# Patient Record
Sex: MALE | Race: WHITE | NOT HISPANIC OR LATINO | Employment: FULL TIME | RURAL
[De-identification: names, ages, dates, MRNs, and addresses within clinical notes are randomized per-mention and may not be internally consistent; named-entity substitution may affect disease eponyms.]

---

## 2022-07-31 ENCOUNTER — HOSPITAL ENCOUNTER (EMERGENCY)
Facility: HOSPITAL | Age: 47
Discharge: HOME | End: 2022-07-31

## 2022-07-31 VITALS
RESPIRATION RATE: 18 BRPM | HEART RATE: 69 BPM | OXYGEN SATURATION: 99 % | BODY MASS INDEX: 34.39 KG/M2 | WEIGHT: 214 LBS | TEMPERATURE: 98 F | DIASTOLIC BLOOD PRESSURE: 71 MMHG | SYSTOLIC BLOOD PRESSURE: 120 MMHG | HEIGHT: 66 IN

## 2022-07-31 DIAGNOSIS — B07.9 BENIGN SKIN LESION EXCLUDING PLANTAR WART: Primary | ICD-10-CM

## 2022-07-31 DIAGNOSIS — L57.0 BENIGN SKIN LESION EXCLUDING PLANTAR WART: Primary | ICD-10-CM

## 2022-07-31 DIAGNOSIS — L98.9 BENIGN SKIN LESION EXCLUDING PLANTAR WART: Primary | ICD-10-CM

## 2022-07-31 PROCEDURE — 99281 EMR DPT VST MAYX REQ PHY/QHP: CPT

## 2022-07-31 PROCEDURE — 99282 EMERGENCY DEPT VISIT SF MDM: CPT | Mod: ,,, | Performed by: NURSE PRACTITIONER

## 2022-07-31 PROCEDURE — 99282 PR EMERGENCY DEPT VISIT,LEVEL II: ICD-10-PCS | Mod: ,,, | Performed by: NURSE PRACTITIONER

## 2022-07-31 NOTE — ED TRIAGE NOTES
Presents to ed with daughter c/o wart pain to bottom of left palm. Reported he was moving a couch and it pulled top of wart causing bleeding .no bleeding at time.

## 2022-07-31 NOTE — ED PROVIDER NOTES
Encounter Date: 7/31/2022       History   No chief complaint on file.    46 yr old WM to ED with c/o bumped wart on palm of left hand this morning and pulled top off of wart.    States wart has been on his hand for years and has tried OTC removals without relief.  .      The history is provided by the patient.   Hand Injury   The incident occurred today. The pain is present in the left hand. He reports no foreign bodies present.     Review of patient's allergies indicates:  No Known Allergies  History reviewed. No pertinent past medical history.  History reviewed. No pertinent surgical history.  History reviewed. No pertinent family history.  Social History     Tobacco Use    Smoking status: Current Some Day Smoker     Types: Cigarettes    Smokeless tobacco: Never Used   Substance Use Topics    Drug use: Never     Review of Systems   Constitutional: Negative.    Respiratory: Negative.    Cardiovascular: Negative.    Musculoskeletal: Negative.    Skin:        Wart with top pulled off while moving a couch this morning.        Physical Exam     Initial Vitals [07/31/22 1213]   BP Pulse Resp Temp SpO2   120/78 74 16 98.3 °F (36.8 °C) 99 %      MAP       --         Physical Exam    Nursing note and vitals reviewed.  Constitutional: He appears well-developed and well-nourished.   Cardiovascular: Normal rate and regular rhythm.   Pulmonary/Chest: Breath sounds normal.   Musculoskeletal:         General: Normal range of motion.     Neurological: He is alert and oriented to person, place, and time.   Skin: Skin is warm and dry. Capillary refill takes less than 2 seconds.         Medical Screening Exam   See Full Note    ED Course   Procedures  Labs Reviewed - No data to display       Imaging Results    None          Medications - No data to display                    Clinical Impression:   Final diagnoses:  [L98.9, B07.9, L57.0] Benign skin lesion excluding plantar wart (Primary)          ED Disposition Condition     Discharge Stable        ED Prescriptions     None        Follow-up Information     Follow up With Specialties Details Why Contact Info    [facility]   [address] [id number] [phone]             TRAY Bradley  07/31/22 1238

## 2022-07-31 NOTE — Clinical Note
"Akira "Akira" Pancho was seen and treated in our emergency department on 7/31/2022.  He may return to work on 08/01/2022.       If you have any questions or concerns, please don't hesitate to call.      TRAY Bradley"

## 2022-07-31 NOTE — DISCHARGE INSTRUCTIONS
Keep clean and dry with antibacterial soap and water.  Follow up with your primary care provider or [facility] .  Return for emergency needs

## 2023-06-21 ENCOUNTER — OFFICE VISIT (OUTPATIENT)
Dept: FAMILY MEDICINE | Facility: CLINIC | Age: 48
End: 2023-06-21

## 2023-06-21 VITALS
HEIGHT: 66 IN | WEIGHT: 200 LBS | DIASTOLIC BLOOD PRESSURE: 80 MMHG | TEMPERATURE: 99 F | OXYGEN SATURATION: 99 % | HEART RATE: 72 BPM | BODY MASS INDEX: 32.14 KG/M2 | SYSTOLIC BLOOD PRESSURE: 119 MMHG

## 2023-06-21 DIAGNOSIS — H66.90 EAR INFECTION: ICD-10-CM

## 2023-06-21 DIAGNOSIS — R42 VERTIGO: ICD-10-CM

## 2023-06-21 DIAGNOSIS — H92.03 EAR PAIN, BILATERAL: ICD-10-CM

## 2023-06-21 DIAGNOSIS — J06.9 UPPER RESPIRATORY TRACT INFECTION, UNSPECIFIED TYPE: Primary | ICD-10-CM

## 2023-06-21 LAB
CTP QC/QA: YES
FLUAV AG NPH QL: NEGATIVE
FLUBV AG NPH QL: NEGATIVE
SARS-COV-2 AG RESP QL IA.RAPID: NEGATIVE

## 2023-06-21 PROCEDURE — 87428 POCT SARS-COV2 (COVID) WITH FLU ANTIGEN: ICD-10-PCS | Mod: QW,,, | Performed by: STUDENT IN AN ORGANIZED HEALTH CARE EDUCATION/TRAINING PROGRAM

## 2023-06-21 PROCEDURE — 99203 OFFICE O/P NEW LOW 30 MIN: CPT | Mod: 25,,, | Performed by: STUDENT IN AN ORGANIZED HEALTH CARE EDUCATION/TRAINING PROGRAM

## 2023-06-21 PROCEDURE — 87428 SARSCOV & INF VIR A&B AG IA: CPT | Mod: QW,,, | Performed by: STUDENT IN AN ORGANIZED HEALTH CARE EDUCATION/TRAINING PROGRAM

## 2023-06-21 PROCEDURE — 99203 PR OFFICE/OUTPT VISIT, NEW, LEVL III, 30-44 MIN: ICD-10-PCS | Mod: 25,,, | Performed by: STUDENT IN AN ORGANIZED HEALTH CARE EDUCATION/TRAINING PROGRAM

## 2023-06-21 PROCEDURE — 96372 THER/PROPH/DIAG INJ SC/IM: CPT | Mod: ,,, | Performed by: STUDENT IN AN ORGANIZED HEALTH CARE EDUCATION/TRAINING PROGRAM

## 2023-06-21 PROCEDURE — 96372 PR INJECTION,THERAP/PROPH/DIAG2ST, IM OR SUBCUT: ICD-10-PCS | Mod: ,,, | Performed by: STUDENT IN AN ORGANIZED HEALTH CARE EDUCATION/TRAINING PROGRAM

## 2023-06-21 RX ORDER — HYDROCORTISONE AND ACETIC ACID 20.75; 10.375 MG/ML; MG/ML
3 SOLUTION AURICULAR (OTIC) 2 TIMES DAILY
Qty: 20 ML | Refills: 0 | Status: SHIPPED | OUTPATIENT
Start: 2023-06-21 | End: 2023-07-01

## 2023-06-21 RX ORDER — AMOXICILLIN AND CLAVULANATE POTASSIUM 875; 125 MG/1; MG/1
1 TABLET, FILM COATED ORAL EVERY 12 HOURS
Qty: 10 TABLET | Refills: 0 | Status: SHIPPED | OUTPATIENT
Start: 2023-06-21 | End: 2023-06-26

## 2023-06-21 RX ORDER — DEXAMETHASONE SODIUM PHOSPHATE 4 MG/ML
4 INJECTION, SOLUTION INTRA-ARTICULAR; INTRALESIONAL; INTRAMUSCULAR; INTRAVENOUS; SOFT TISSUE
Status: COMPLETED | OUTPATIENT
Start: 2023-06-21 | End: 2023-06-21

## 2023-06-21 RX ORDER — FLUTICASONE PROPIONATE 50 MCG
2 SPRAY, SUSPENSION (ML) NASAL 2 TIMES DAILY
Qty: 15.5 ML | Refills: 2 | Status: SHIPPED | OUTPATIENT
Start: 2023-06-21 | End: 2024-03-07

## 2023-06-21 RX ORDER — BUSPIRONE HYDROCHLORIDE 15 MG/1
15 TABLET ORAL
COMMUNITY
End: 2024-03-07

## 2023-06-21 RX ADMIN — DEXAMETHASONE SODIUM PHOSPHATE 4 MG: 4 INJECTION, SOLUTION INTRA-ARTICULAR; INTRALESIONAL; INTRAMUSCULAR; INTRAVENOUS; SOFT TISSUE at 10:06

## 2023-06-21 NOTE — PATIENT INSTRUCTIONS
You should be feeling better within 2 days - give us a call back and let us know if you are not feeling better.

## 2023-06-21 NOTE — LETTER
June 21, 2023      [facility]  [address]  [place] [address]  Phone: [phone]  Fax: [phone]       Patient: Akira Deleon   YOB: 1975  Date of Visit: 06/21/2023    To Whom It May Concern:    Shannon Deleon  was at [facility] on 06/21/2023. The patient may return to work/school on :  06/22/2023 with no restrictions. If you have any questions or concerns, or if I can be of further assistance, please do not hesitate to contact me.    Sincerely,    Mildred Oswald LPN

## 2023-06-21 NOTE — PROGRESS NOTES
"Subjective     Patient ID: Akira Deleon is a 47 y.o. male.    Chief Complaint: Otalgia (Both ears), Dizziness, Headache, Generalized Body Aches, and Chills (X 3 days)    HPI    47-year-old new patient here with with URI symptoms the last several days. His biggest concern is b/l rar pain; extends down both side of his neck No vision changes - but does gets dizzy and does have vertigo. Also complains of sore throat. Itchy. Some nasal congestion. Elevated temperature recorded home. Moderate symptoms. No exacerbating / relieving factors noted.      Objective     /80   Pulse 72   Temp 98.9 °F (37.2 °C) (Oral)   Ht 5' 6" (1.676 m)   Wt 90.7 kg (200 lb)   SpO2 99%   BMI 32.28 kg/m²       Physical Exam    Gen: well-groomed, well-dressed. No apparent distress  HEENT:  NCAT, symmetric; b/l TM benign; external auditory canal ttp; no mastoid tenderness but surrounding musculature slightly ttp; no palpable cervical LAD or unilateral symptoms; no paransal/frontal sinus tenderness. Oropharynx benign  Pulm: normal work of breathing, no accessory muscle use; speaking complete sentences without having to stop  Neuro: CN II-XII grossly normal   Psych: pleasant affect, congruent mood. Linear thought; good eye contact  SKIN: no rashes present on face, neck, hands,e       Assessment and Plan     Problem List Items Addressed This Visit    None  Visit Diagnoses       Upper respiratory tract infection, unspecified type    -  Primary    Relevant Orders    POCT SARS-COV2 (COVID) with Flu Antigen (Completed)    Ear infection        Relevant Medications    amoxicillin-clavulanate 875-125mg (AUGMENTIN) 875-125 mg per tablet    fluticasone propionate (FLONASE) 50 mcg/actuation nasal spray    sodium chloride (SALINE NASAL) 0.65 % nasal spray    acetic acid-hydrocortisone (VOSOL-HC) otic solution    dexAMETHasone injection 4 mg (Completed)    Ear pain, bilateral        Relevant Medications    amoxicillin-clavulanate 875-125mg " (AUGMENTIN) 875-125 mg per tablet    fluticasone propionate (FLONASE) 50 mcg/actuation nasal spray    sodium chloride (SALINE NASAL) 0.65 % nasal spray    acetic acid-hydrocortisone (VOSOL-HC) otic solution    dexAMETHasone injection 4 mg (Completed)    Vertigo        Relevant Medications    amoxicillin-clavulanate 875-125mg (AUGMENTIN) 875-125 mg per tablet    fluticasone propionate (FLONASE) 50 mcg/actuation nasal spray    sodium chloride (SALINE NASAL) 0.65 % nasal spray    acetic acid-hydrocortisone (VOSOL-HC) otic solution    dexAMETHasone injection 4 mg (Completed)          covid/flu negative in clinic today. Because he is feeling so bad will empirically treat for bacterial infection and IM GC. Also recommended symptomatic treatment as per above and returning to clinic if symptoms worsen or fail to improve in the next few days. He expressed understanding and agreement with this plan.

## 2024-03-07 ENCOUNTER — HOSPITAL ENCOUNTER (EMERGENCY)
Facility: HOSPITAL | Age: 49
Discharge: HOME | End: 2024-03-07
Payer: COMMERCIAL

## 2024-03-07 VITALS
DIASTOLIC BLOOD PRESSURE: 76 MMHG | SYSTOLIC BLOOD PRESSURE: 118 MMHG | OXYGEN SATURATION: 99 % | TEMPERATURE: 98 F | RESPIRATION RATE: 18 BRPM | HEART RATE: 65 BPM

## 2024-03-07 DIAGNOSIS — R42 DIZZINESS: ICD-10-CM

## 2024-03-07 DIAGNOSIS — R42 VERTIGO: Primary | ICD-10-CM

## 2024-03-07 LAB
ALBUMIN SERPL BCP-MCNC: 3.7 G/DL (ref 3.5–5)
ALBUMIN/GLOB SERPL: 1.2 {RATIO}
ALP SERPL-CCNC: 78 U/L (ref 45–115)
ALT SERPL W P-5'-P-CCNC: 25 U/L (ref 16–61)
ANION GAP SERPL CALCULATED.3IONS-SCNC: 13 MMOL/L (ref 7–16)
AST SERPL W P-5'-P-CCNC: 15 U/L (ref 15–37)
BASOPHILS # BLD AUTO: 0.03 K/UL (ref 0–0.2)
BASOPHILS NFR BLD AUTO: 0.3 % (ref 0–1)
BILIRUB SERPL-MCNC: 0.5 MG/DL (ref ?–1.2)
BUN SERPL-MCNC: 16 MG/DL (ref 7–18)
BUN/CREAT SERPL: 13 (ref 6–20)
CALCIUM SERPL-MCNC: 8.1 MG/DL (ref 8.5–10.1)
CHLORIDE SERPL-SCNC: 103 MMOL/L (ref 98–107)
CO2 SERPL-SCNC: 28 MMOL/L (ref 21–32)
CREAT SERPL-MCNC: 1.26 MG/DL (ref 0.7–1.3)
DIFFERENTIAL METHOD BLD: ABNORMAL
EGFR (NO RACE VARIABLE) (RUSH/TITUS): 71 ML/MIN/1.73M2
EOSINOPHIL # BLD AUTO: 0.2 K/UL (ref 0–0.5)
EOSINOPHIL NFR BLD AUTO: 2.3 % (ref 1–4)
ERYTHROCYTE [DISTWIDTH] IN BLOOD BY AUTOMATED COUNT: 12.7 % (ref 11.5–14.5)
GLOBULIN SER-MCNC: 3.1 G/DL (ref 2–4)
GLUCOSE SERPL-MCNC: 137 MG/DL (ref 74–106)
HCT VFR BLD AUTO: 43.8 % (ref 40–54)
HGB BLD-MCNC: 14.8 G/DL (ref 13.5–18)
INFLUENZA A MOLECULAR (OHS): NEGATIVE
INFLUENZA B MOLECULAR (OHS): NEGATIVE
LYMPHOCYTES # BLD AUTO: 2.73 K/UL (ref 1–4.8)
LYMPHOCYTES NFR BLD AUTO: 31.5 % (ref 27–41)
MAGNESIUM SERPL-MCNC: 1.9 MG/DL (ref 1.7–2.3)
MCH RBC QN AUTO: 28.5 PG (ref 27–31)
MCHC RBC AUTO-ENTMCNC: 33.8 G/DL (ref 32–36)
MCV RBC AUTO: 84.2 FL (ref 80–96)
MONOCYTES # BLD AUTO: 0.67 K/UL (ref 0–0.8)
MONOCYTES NFR BLD AUTO: 7.7 % (ref 2–6)
MPC BLD CALC-MCNC: 10.2 FL (ref 9.4–12.4)
NEUTROPHILS # BLD AUTO: 5.04 K/UL (ref 1.8–7.7)
NEUTROPHILS NFR BLD AUTO: 58.2 % (ref 53–65)
PLATELET # BLD AUTO: 191 K/UL (ref 150–400)
POTASSIUM SERPL-SCNC: 3.5 MMOL/L (ref 3.5–5.1)
PROT SERPL-MCNC: 6.8 G/DL (ref 6.4–8.2)
RBC # BLD AUTO: 5.2 M/UL (ref 4.6–6.2)
SARS-COV+SARS-COV-2 AG RESP QL IA.RAPID: NEGATIVE
SODIUM SERPL-SCNC: 140 MMOL/L (ref 136–145)
TROPONIN I SERPL DL<=0.01 NG/ML-MCNC: 5.2 PG/ML
TSH SERPL DL<=0.005 MIU/L-ACNC: 2.31 UIU/ML (ref 0.36–3.74)
WBC # BLD AUTO: 8.67 K/UL (ref 4.5–11)

## 2024-03-07 PROCEDURE — 83735 ASSAY OF MAGNESIUM: CPT | Performed by: NURSE PRACTITIONER

## 2024-03-07 PROCEDURE — 84443 ASSAY THYROID STIM HORMONE: CPT | Performed by: NURSE PRACTITIONER

## 2024-03-07 PROCEDURE — 93005 ELECTROCARDIOGRAM TRACING: CPT

## 2024-03-07 PROCEDURE — 99284 EMERGENCY DEPT VISIT MOD MDM: CPT | Mod: ,,, | Performed by: NURSE PRACTITIONER

## 2024-03-07 PROCEDURE — 87502 INFLUENZA DNA AMP PROBE: CPT | Performed by: NURSE PRACTITIONER

## 2024-03-07 PROCEDURE — 85025 COMPLETE CBC W/AUTO DIFF WBC: CPT | Performed by: NURSE PRACTITIONER

## 2024-03-07 PROCEDURE — 84484 ASSAY OF TROPONIN QUANT: CPT | Performed by: NURSE PRACTITIONER

## 2024-03-07 PROCEDURE — 99285 EMERGENCY DEPT VISIT HI MDM: CPT | Mod: 25

## 2024-03-07 PROCEDURE — 80053 COMPREHEN METABOLIC PANEL: CPT | Performed by: NURSE PRACTITIONER

## 2024-03-07 PROCEDURE — 87426 SARSCOV CORONAVIRUS AG IA: CPT | Performed by: NURSE PRACTITIONER

## 2024-03-07 PROCEDURE — 93010 ELECTROCARDIOGRAM REPORT: CPT | Mod: ,,, | Performed by: INTERNAL MEDICINE

## 2024-03-07 RX ORDER — MECLIZINE HYDROCHLORIDE 25 MG/1
25 TABLET ORAL 3 TIMES DAILY PRN
Qty: 20 TABLET | Refills: 0 | Status: SHIPPED | OUTPATIENT
Start: 2024-03-07

## 2024-03-08 LAB
OHS QRS DURATION: 68 MS
OHS QTC CALCULATION: 430 MS

## 2024-03-08 NOTE — ED NOTES
Patient discharged to home via private vehicle with wife driving, discharge instructions given with voiced understanding. GABE

## 2024-03-08 NOTE — ED PROVIDER NOTES
"Encounter Date: 3/7/2024       History     Chief Complaint   Patient presents with    Dizziness     Started after lunch and continued to worsen     Headache     49 y/o WM presents pov with c/o dizziness with visual changes with onset 10 hours PTA to ED. Patient states dizziness started after lunch today while at work. He spent most of the afternoon looking up working with a back hoe. He thought this might be contributing to his dizziness but the dizziness worsened after he got home. He states his vision is clear but objects he is looking at seem to be pulsating. He states a h/o inner ear with vertigo but states this doesn't present quiet the same way as previous episodes of vertigo. Negative for fever/chills, sinus pressure/pain, nasal congestion, ear pain, nausea, or vomiting. Patient does complain of mild frontal headache. Rates pain 1/10 and describes as dull.        Review of patient's allergies indicates:   Allergen Reactions    Bactrim [sulfamethoxazole-trimethoprim] Anaphylaxis     History reviewed. No pertinent past medical history.  History reviewed. No pertinent surgical history.  Family History   Problem Relation Age of Onset    Hypertension Mother     Diabetes Mother     Heart disease Father     Cancer Father      Social History     Tobacco Use    Smoking status: Unknown    Smokeless tobacco: Current     Types: Snuff    Tobacco comments:     States has been "dipping" 20 years   Substance Use Topics    Alcohol use: Not Currently    Drug use: Never     Review of Systems   Constitutional:  Negative for chills and fever.   HENT:  Negative for congestion, ear discharge, ear pain, facial swelling, hearing loss, postnasal drip, rhinorrhea, sinus pressure, sinus pain, sore throat and trouble swallowing.    Eyes:  Positive for visual disturbance. Negative for photophobia, pain, discharge, redness and itching.   Respiratory:  Negative for apnea, cough, choking, chest tightness, shortness of breath, wheezing and " stridor.    Cardiovascular:  Negative for chest pain, palpitations and leg swelling.   Gastrointestinal: Negative.    Genitourinary: Negative.    Musculoskeletal: Negative.    Skin: Negative.    Neurological:  Positive for dizziness and headaches. Negative for tremors, seizures, syncope, facial asymmetry, speech difficulty, weakness, light-headedness and numbness.   All other systems reviewed and are negative.      Physical Exam     Initial Vitals [03/07/24 2152]   BP Pulse Resp Temp SpO2   119/81 65 18 97.7 °F (36.5 °C) 99 %      MAP       --         Physical Exam    Nursing note and vitals reviewed.  Constitutional: He appears well-developed and well-nourished. No distress.   HENT:   Head: Normocephalic.   Right Ear: Tympanic membrane and ear canal normal.   Left Ear: Tympanic membrane and ear canal normal.   Nose: Nose normal. Right sinus exhibits no maxillary sinus tenderness and no frontal sinus tenderness. Left sinus exhibits no maxillary sinus tenderness and no frontal sinus tenderness.   Mouth/Throat: Uvula is midline, oropharynx is clear and moist and mucous membranes are normal.   Eyes: Conjunctivae and EOM are normal. Pupils are equal, round, and reactive to light. No scleral icterus.   Neck: Neck supple.   Normal range of motion.  Cardiovascular:  Normal rate, regular rhythm, normal heart sounds and intact distal pulses.     Exam reveals no gallop and no friction rub.       No murmur heard.  Pulmonary/Chest: Breath sounds normal. No respiratory distress. He has no wheezes. He has no rhonchi. He has no rales. He exhibits no tenderness.   Abdominal: Abdomen is soft. Bowel sounds are normal. He exhibits no distension and no mass. There is no abdominal tenderness. There is no rebound and no guarding.   Musculoskeletal:         General: No tenderness. Normal range of motion.      Cervical back: Normal range of motion and neck supple.     Lymphadenopathy:     He has no cervical adenopathy.   Neurological: He  is alert and oriented to person, place, and time. He has normal strength. A sensory deficit is present. No cranial nerve deficit. He displays a negative Romberg sign. GCS score is 15. GCS eye subscore is 4. GCS verbal subscore is 5. GCS motor subscore is 6.   Mild numbness left side of face   Skin: Skin is warm and dry. Capillary refill takes less than 2 seconds.   Psychiatric: He has a normal mood and affect. His behavior is normal. Judgment and thought content normal.         Medical Screening Exam   See Full Note    ED Course   Procedures  Labs Reviewed   COMPREHENSIVE METABOLIC PANEL - Abnormal; Notable for the following components:       Result Value    Glucose 137 (*)     Calcium 8.1 (*)     All other components within normal limits   CBC WITH DIFFERENTIAL - Abnormal; Notable for the following components:    Monocytes % 7.7 (*)     All other components within normal limits   INFLUENZA A & B BY MOLECULAR - Normal   TROPONIN I - Normal   TSH - Normal   MAGNESIUM - Normal   SARS ANTIGEN(PEDRO) - Normal    Narrative:     Negative SARS-CoV results should not be used as the sole basis for treatment or patient management decisions; negative results should be considered in the context of a patient's recent exposures, history and the presene of clinical signs and symptoms consistent with COVID-19.  Negative results should be treated as presumptive and confirmed by molecular assay, if necessary for patient management.   CBC W/ AUTO DIFFERENTIAL    Narrative:     The following orders were created for panel order CBC auto differential.  Procedure                               Abnormality         Status                     ---------                               -----------         ------                     CBC with Differential[[id number]]        Abnormal            Final result                 Please view results for these tests on the individual orders.     EKG Readings: (Independently Interpreted)   Rhythm: Normal Sinus  Rhythm. Heart Rate: 71. Ectopy: No Ectopy. Conduction: Normal. ST Segments: Normal ST Segments. T Waves: Normal. Axis: Normal. Clinical Impression: Normal Sinus Rhythm       Imaging Results              CT Head Without Contrast (In process)                      X-Ray Chest 1 View (In process)                      Medications - No data to display  Medical Decision Making  49 y/o WM presents pov with c/o dizziness with visual changes with onset 10 hours PTA to ED. Patient states dizziness started after lunch today while at work. He spent most of the afternoon looking up working with a back hoe. He thought this might be contributing to his dizziness but the dizziness worsened after he got home. He states his vision is clear but objects he is looking at seem to be pulsating. He states a h/o inner ear with vertigo but states this doesn't present quiet the same way as previous episodes of vertigo. Negative for fever/chills, sinus pressure/pain, nasal congestion, ear pain, nausea, or vomiting. Patient does complain of mild frontal headache. Rates pain 1/10 and describes as dull.    Amount and/or Complexity of Data Reviewed  Labs: ordered.     Details: CBC: WNL  CMP: Calcium 8.1  Troponin: 5.2  TSH: 2.310  Magnesium: 1.9  Covid: Negative  Flu: Negative  Radiology: ordered.     Details: CT Head: Negative head CT.  CXR: No acute pulmonary pathology.               ED Course as of 03/07/24 2334   Thu Mar 07, 2024   2330 CT Head: Negative head CT.  CXR: No acute pulmonary pathology. [[place]]      ED Course User Index  [[place]] Kirk Alexis FNP                           Clinical Impression:   Final diagnoses:  [R42] Dizziness  [R42] Vertigo (Primary)        ED Disposition Condition    Discharge Stable          ED Prescriptions       Medication Sig Dispense Start Date End Date Auth. Provider    meclizine (ANTIVERT) 25 mg tablet Take 1 tablet (25 mg total) by mouth 3 (three) times daily as needed for Dizziness. 20 tablet 3/7/2024 --  Kirk Alexis, TRAY          Follow-up Information    None          Kirk Alexis, TRAY  03/07/24 0927

## 2024-03-08 NOTE — ED TRIAGE NOTES
Patient arrived to ED via private vehicle with wife, with c/o dizziness that started after lunch today, and has continued to get worse, hx of vertigo and inner ear issues. Voiced that he has a slight h/a that is a 1 on pain level scale, just irritating not really hurting per patient. NO other issues voided.

## 2024-05-15 ENCOUNTER — HOSPITAL ENCOUNTER (EMERGENCY)
Facility: HOSPITAL | Age: 49
Discharge: HOME | End: 2024-05-15
Payer: COMMERCIAL

## 2024-05-15 VITALS
HEIGHT: 65 IN | DIASTOLIC BLOOD PRESSURE: 69 MMHG | OXYGEN SATURATION: 99 % | HEART RATE: 80 BPM | RESPIRATION RATE: 18 BRPM | BODY MASS INDEX: 31.05 KG/M2 | WEIGHT: 186.38 LBS | SYSTOLIC BLOOD PRESSURE: 121 MMHG | TEMPERATURE: 99 F

## 2024-05-15 DIAGNOSIS — N20.0 KIDNEY STONE: ICD-10-CM

## 2024-05-15 DIAGNOSIS — N30.01 ACUTE CYSTITIS WITH HEMATURIA: Primary | ICD-10-CM

## 2024-05-15 DIAGNOSIS — S82.002A LEFT PATELLA FRACTURE: ICD-10-CM

## 2024-05-15 DIAGNOSIS — S82.892A ANKLE FRACTURE, LEFT: ICD-10-CM

## 2024-05-15 DIAGNOSIS — S62.101A WRIST FRACTURE, RIGHT: Primary | ICD-10-CM

## 2024-05-15 LAB
ANION GAP SERPL CALCULATED.3IONS-SCNC: 10 MMOL/L (ref 7–16)
BACTERIA #/AREA URNS HPF: ABNORMAL /HPF
BASOPHILS NFR BLD AUTO: 0.4 % (ref 0–1)
BILIRUB UR QL STRIP: NEGATIVE
BUN SERPL-MCNC: 12 MG/DL (ref 7–18)
BUN/CREAT SERPL: 10 (ref 6–20)
CALCIUM SERPL-MCNC: 9.1 MG/DL (ref 8.5–10.1)
CHLORIDE SERPL-SCNC: 102 MMOL/L (ref 98–107)
CLARITY UR: ABNORMAL
CO2 SERPL-SCNC: 30 MMOL/L (ref 21–32)
COLOR UR: ABNORMAL
CREAT SERPL-MCNC: 1.19 MG/DL (ref 0.7–1.3)
DIFFERENTIAL METHOD BLD: ABNORMAL
EGFR (NO RACE VARIABLE) (RUSH/TITUS): 76 ML/MIN/1.73M2
EOSINOPHIL NFR BLD AUTO: 2.1 % (ref 1–4)
ERYTHROCYTE [DISTWIDTH] IN BLOOD BY AUTOMATED COUNT: 12.7 % (ref 11.5–14.5)
GLUCOSE SERPL-MCNC: 145 MG/DL (ref 74–106)
GLUCOSE UR STRIP-MCNC: NEGATIVE MG/DL
HCT VFR BLD AUTO: 40.1 % (ref 40–54)
HGB BLD-MCNC: 13 G/DL (ref 13.5–18)
KETONES UR STRIP-SCNC: ABNORMAL MG/DL
LEUKOCYTE ESTERASE UR QL STRIP: ABNORMAL
LYMPHOCYTES NFR BLD AUTO: 9.6 % (ref 27–41)
MCH RBC QN AUTO: 27.7 PG (ref 27–31)
MCHC RBC AUTO-ENTMCNC: 32.4 G/DL (ref 32–36)
MCV RBC AUTO: 85.3 FL (ref 80–96)
MONOCYTES NFR BLD AUTO: 6.5 % (ref 2–6)
MPC BLD CALC-MCNC: 10.4 FL (ref 9.4–12.4)
NEUTROPHILS NFR BLD AUTO: 81.4 % (ref 53–65)
NITRITE UR QL STRIP: POSITIVE
PH UR STRIP: 5.5 PH UNITS
PLATELET # BLD AUTO: 160 K/UL (ref 150–400)
POTASSIUM SERPL-SCNC: 3.6 MMOL/L (ref 3.5–5.1)
PROT UR QL STRIP: >=300
RBC # BLD AUTO: 4.7 M/UL (ref 4.6–6.2)
RBC # UR STRIP: ABNORMAL /UL
RBC #/AREA URNS HPF: ABNORMAL /HPF
SODIUM SERPL-SCNC: 138 MMOL/L (ref 136–145)
SP GR UR STRIP: >=1.03
SQUAMOUS #/AREA URNS LPF: ABNORMAL /LPF
UROBILINOGEN UR STRIP-ACNC: 0.2 MG/DL
WBC # BLD AUTO: 12.85 K/UL (ref 4.5–11)
WBC #/AREA URNS HPF: ABNORMAL /HPF

## 2024-05-15 PROCEDURE — 96372 THER/PROPH/DIAG INJ SC/IM: CPT | Performed by: NURSE PRACTITIONER

## 2024-05-15 PROCEDURE — 81001 URINALYSIS AUTO W/SCOPE: CPT | Performed by: NURSE PRACTITIONER

## 2024-05-15 PROCEDURE — 99284 EMERGENCY DEPT VISIT MOD MDM: CPT | Mod: ,,, | Performed by: NURSE PRACTITIONER

## 2024-05-15 PROCEDURE — 99285 EMERGENCY DEPT VISIT HI MDM: CPT | Mod: 25

## 2024-05-15 PROCEDURE — 85025 COMPLETE CBC W/AUTO DIFF WBC: CPT | Performed by: NURSE PRACTITIONER

## 2024-05-15 PROCEDURE — 80048 BASIC METABOLIC PNL TOTAL CA: CPT | Performed by: NURSE PRACTITIONER

## 2024-05-15 PROCEDURE — 63600175 PHARM REV CODE 636 W HCPCS: Performed by: NURSE PRACTITIONER

## 2024-05-15 PROCEDURE — 87086 URINE CULTURE/COLONY COUNT: CPT | Performed by: NURSE PRACTITIONER

## 2024-05-15 RX ORDER — CEFTRIAXONE 1 G/1
1 INJECTION, POWDER, FOR SOLUTION INTRAMUSCULAR; INTRAVENOUS
Status: COMPLETED | OUTPATIENT
Start: 2024-05-15 | End: 2024-05-15

## 2024-05-15 RX ORDER — NITROFURANTOIN 25; 75 MG/1; MG/1
100 CAPSULE ORAL 2 TIMES DAILY
Qty: 10 CAPSULE | Refills: 0 | Status: SHIPPED | OUTPATIENT
Start: 2024-05-15 | End: 2024-05-20

## 2024-05-15 RX ADMIN — CEFTRIAXONE 1 G: 1 INJECTION, POWDER, FOR SOLUTION INTRAMUSCULAR; INTRAVENOUS at 08:05

## 2024-05-15 NOTE — Clinical Note
"Akira "Akira" Pancho was seen and treated in our emergency department on 5/15/2024.  He may return to work on 05/20/2024.       If you have any questions or concerns, please don't hesitate to call.      Tez Mckeon, FNP"

## 2024-05-16 NOTE — ED PROVIDER NOTES
"Encounter Date: 5/15/2024       History     Chief Complaint   Patient presents with    painful urination     Dysuria, hematuria     C/o hematuria and dysuria started today.  No flank pain.  Had a lombardo until about two weeks ago.      The history is provided by the patient.     Review of patient's allergies indicates:   Allergen Reactions    Bactrim [sulfamethoxazole-trimethoprim] Anaphylaxis     History reviewed. No pertinent past medical history.  History reviewed. No pertinent surgical history.  Family History   Problem Relation Name Age of Onset    Hypertension Mother      Diabetes Mother      Heart disease Father      Cancer Father       Social History     Tobacco Use    Smoking status: Unknown    Smokeless tobacco: Current     Types: Snuff    Tobacco comments:     States has been "dipping" 20 years   Substance Use Topics    Alcohol use: Not Currently    Drug use: Never     Review of Systems   Genitourinary:  Positive for difficulty urinating, dysuria and hematuria.   All other systems reviewed and are negative.      Physical Exam     Initial Vitals [05/15/24 1859]   BP Pulse Resp Temp SpO2   117/73 82 18 98.9 °F (37.2 °C) 98 %      MAP       --         Physical Exam    Constitutional: He appears well-developed and well-nourished.   HENT:   Head: Normocephalic.   Right Ear: External ear normal.   Left Ear: External ear normal.   Mouth/Throat: Oropharynx is clear and moist.   Eyes: Pupils are equal, round, and reactive to light.   Neck:   Normal range of motion.  Cardiovascular:  Normal rate, regular rhythm and normal heart sounds.           Pulmonary/Chest: Breath sounds normal.   Abdominal: Abdomen is soft. Bowel sounds are normal.   Musculoskeletal:         General: Normal range of motion.      Cervical back: Normal range of motion.      Comments: No cva tenderness     Neurological: He is alert and oriented to person, place, and time.   Skin: Skin is warm.         Medical Screening Exam   See Full Note    ED " Course   Procedures  Labs Reviewed   URINALYSIS, REFLEX TO URINE CULTURE - Abnormal; Notable for the following components:       Result Value    Leukocytes, UA Small (*)     Nitrites, UA Positive (*)     Protein, UA >=300 (*)     Blood, UA Large (*)     Specific Gravity, UA >=1.030 (*)     All other components within normal limits   URINALYSIS, MICROSCOPIC - Abnormal; Notable for the following components:    WBC, UA Too Numerous To Count (*)     RBC, UA Too Numerous To Count (*)     Bacteria, UA Few (*)     All other components within normal limits   BASIC METABOLIC PANEL - Abnormal; Notable for the following components:    Glucose 145 (*)     All other components within normal limits   CBC WITH DIFFERENTIAL - Abnormal; Notable for the following components:    WBC 12.85 (*)     Hemoglobin 13.0 (*)     Neutrophils % 81.4 (*)     Lymphocytes % 9.6 (*)     Monocytes % 6.5 (*)     All other components within normal limits   CULTURE, URINE   CBC W/ AUTO DIFFERENTIAL    Narrative:     The following orders were created for panel order CBC auto differential.  Procedure                               Abnormality         Status                     ---------                               -----------         ------                     CBC with Differential[[id number]]       Abnormal            Final result               Manual Differential[[id number]]                             Final result                 Please view results for these tests on the individual orders.   MANUAL DIFFERENTIAL          Imaging Results              CT Renal Stone Study ABD Pelvis WO (Final result)  Result time 05/15/24 19:40:50      Final result by Pedro Luis Gtz MD (05/15/24 19:40:50)                   Impression:      7 x 10 mm calculus distal left ureter in the setting of moderately atrophic and potentially nonfunctioning left kidney.  No left-sided hydronephrosis    No radiopaque renal or ureteral stone on the right    Potential gallbladder  sludge      Electronically signed by: Pedro Luis Gtz  Date:    05/15/2024  Time:    19:40               Narrative:    EXAMINATION:  CT ABDOMEN AND PELVIS without contrast    CLINICAL HISTORY:  Hematuria    TECHNIQUE:  Axial CT images were obtained through the abdomen and pelvis without IV contrast.  Oral contrast was not given.  Coronal and sagittal reconstructions submitted and interpreted.  Total  mGycm.  Automated exposure control utilized.    COMPARISON:  November 11, 2018    FINDINGS:  CT abdomen:    Partially visualized lung bases are clear.    There is no evidence of pneumoperitoneum.    There is no gross renal mass seen on this noncontrast study.  There is moderate diffuse renal atrophy on the left.  There is no hydronephrosis.  There is a nonobstructing 7 x 10 mm calculus in the distal left ureter within 2 cm of the UVJ.    Liver, bile ducts, spleen, pancreas, and adrenal glands are generally unremarkable compared to the 2018 study.  There is some medium density opacity associated with the gallbladder lumen without obvious calcific density gallstone.  Consider underlying gallbladder sludge.    There is no aneurysm of the mildly calcified abdominal aorta.    There is a moderate amount of ingested material within the gastric lumen.  Stomach is otherwise unremarkable.  Appendix appears normal.    There is no hugo bowel obstruction.    There is no retroperitoneal lymphadenopathy by short-axis diameter criteria.    CT pelvis:    Urinary bladder is not well distended and is grossly unremarkable.  No pelvic mass is seen.    No acute osseous findings.                                    X-Rays:   Independently Interpreted Readings:   Abdomen: Impression:     7 x 10 mm calculus distal left ureter in the setting of moderately atrophic and potentially nonfunctioning left kidney.  No left-sided hydronephrosis  Chronic      Medications   cefTRIAXone injection 1 g (1 g Intramuscular Given 5/15/24 2001)      Medical Decision Making  C/o hematuria and dysuria started today.  No flank pain.  Had a lombardo until about two weeks ago.      Amount and/or Complexity of Data Reviewed  Radiology: ordered.                                      Clinical Impression:   Final diagnoses:  [N30.01] Acute cystitis with hematuria (Primary)  [N20.0] Kidney stone        ED Disposition Condition    Discharge Stable          ED Prescriptions       Medication Sig Dispense Start Date End Date Auth. Provider    nitrofurantoin, macrocrystal-monohydrate, (MACROBID) 100 MG capsule Take 1 capsule (100 mg total) by mouth 2 (two) times daily. for 5 days 10 capsule 5/15/2024 5/20/2024 Tez Mckeon, TRAY          Follow-up Information    None     Will have him follow up with urology.  The kidney atrophy is chronic when compared to ct done 3/31/24.  Rocephin 1 gm IM given and will start on macrobid. Return to er for any fever or worsening symptoms      Tez Mckeon FNP  05/15/24 2043

## 2024-05-16 NOTE — ED TRIAGE NOTES
48 year old WNWD male presents to ER with c/o painful urination and hematuria onset today. Pt has had same c/o since wearing catheter.  All vs are WNL. Pt is AA&O x 4.

## 2024-05-18 LAB — UA COMPLETE W REFLEX CULTURE PNL UR: ABNORMAL

## 2024-05-22 ENCOUNTER — OFFICE VISIT (OUTPATIENT)
Dept: FAMILY MEDICINE | Facility: CLINIC | Age: 49
End: 2024-05-22
Payer: COMMERCIAL

## 2024-05-22 VITALS
BODY MASS INDEX: 30.59 KG/M2 | HEART RATE: 67 BPM | OXYGEN SATURATION: 98 % | SYSTOLIC BLOOD PRESSURE: 118 MMHG | TEMPERATURE: 98 F | DIASTOLIC BLOOD PRESSURE: 77 MMHG | WEIGHT: 183.63 LBS | HEIGHT: 65 IN

## 2024-05-22 DIAGNOSIS — N26.1 LEFT RENAL ATROPHY: ICD-10-CM

## 2024-05-22 DIAGNOSIS — N30.01 ACUTE CYSTITIS WITH HEMATURIA: Primary | ICD-10-CM

## 2024-05-22 LAB
BILIRUB SERPL-MCNC: ABNORMAL MG/DL
BLOOD URINE, POC: ABNORMAL
COLOR, POC UA: YELLOW
GLUCOSE UR QL STRIP: ABNORMAL
KETONES UR QL STRIP: ABNORMAL
LEUKOCYTE ESTERASE URINE, POC: ABNORMAL
NITRITE, POC UA: ABNORMAL
PH, POC UA: 5.5
PROTEIN, POC: >=300
SPECIFIC GRAVITY, POC UA: >=1.03
UROBILINOGEN, POC UA: 0.2

## 2024-05-22 PROCEDURE — 81003 URINALYSIS AUTO W/O SCOPE: CPT | Mod: QW,,, | Performed by: STUDENT IN AN ORGANIZED HEALTH CARE EDUCATION/TRAINING PROGRAM

## 2024-05-22 PROCEDURE — 3008F BODY MASS INDEX DOCD: CPT | Mod: CPTII,,, | Performed by: STUDENT IN AN ORGANIZED HEALTH CARE EDUCATION/TRAINING PROGRAM

## 2024-05-22 PROCEDURE — 87077 CULTURE AEROBIC IDENTIFY: CPT | Mod: ,,, | Performed by: CLINICAL MEDICAL LABORATORY

## 2024-05-22 PROCEDURE — 87186 SC STD MICRODIL/AGAR DIL: CPT | Mod: ,,, | Performed by: CLINICAL MEDICAL LABORATORY

## 2024-05-22 PROCEDURE — 3074F SYST BP LT 130 MM HG: CPT | Mod: CPTII,,, | Performed by: STUDENT IN AN ORGANIZED HEALTH CARE EDUCATION/TRAINING PROGRAM

## 2024-05-22 PROCEDURE — 87086 URINE CULTURE/COLONY COUNT: CPT | Mod: ,,, | Performed by: CLINICAL MEDICAL LABORATORY

## 2024-05-22 PROCEDURE — 99214 OFFICE O/P EST MOD 30 MIN: CPT | Mod: ,,, | Performed by: STUDENT IN AN ORGANIZED HEALTH CARE EDUCATION/TRAINING PROGRAM

## 2024-05-22 PROCEDURE — 3078F DIAST BP <80 MM HG: CPT | Mod: CPTII,,, | Performed by: STUDENT IN AN ORGANIZED HEALTH CARE EDUCATION/TRAINING PROGRAM

## 2024-05-22 RX ORDER — NITROFURANTOIN 25; 75 MG/1; MG/1
100 CAPSULE ORAL 2 TIMES DAILY
Qty: 28 CAPSULE | Refills: 0 | Status: SHIPPED | OUTPATIENT
Start: 2024-05-22 | End: 2024-05-22

## 2024-05-22 RX ORDER — NITROFURANTOIN 25; 75 MG/1; MG/1
100 CAPSULE ORAL 2 TIMES DAILY
Qty: 28 CAPSULE | Refills: 0 | Status: SHIPPED | OUTPATIENT
Start: 2024-05-22 | End: 2024-06-05

## 2024-05-22 NOTE — PROGRESS NOTES
"Subjective:      Akira Deleon is a 48 y.o.male who presents to clinic for Urinary Tract Infection (Pt stated it burns when he urinate. )    Patient presents to clinic with complaints of dysuria.    Symptoms began this morning. Complaining of dysuria, hematuria, and suprapubic abdominal pain. He recently finished a course of macrobid for UTI (last dose was yesterday). He was treated at [facility] last week for this. Urine culture with e.coli that was resistance to some antimicrobials.     Per chart review and CT scan "7 x 10 mm calculus distal left ureter in the setting of moderately atrophic and potentially nonfunctioning left kidney. No left-sided hydronephrosis"    He knew he had a left renal stone that was diagnosed in 2018 and was told he would pass it on his own. Referral to Urology was placed on ER and he has an appt in August. Renal function remains intact based on ER labs.       ROS     Past Medical History:  has no past medical history on file.   Past Surgical History:  has no past surgical history on file.  Family History: family history includes Cancer in his father; Diabetes in his mother; Heart disease in his father; Hypertension in his mother.  Social History:  reports that he has an unknown smoking status. His smokeless tobacco use includes snuff. He reports that he does not currently use alcohol. He reports that he does not use drugs.  Allergies:   Review of patient's allergies indicates:   Allergen Reactions    Bactrim [sulfamethoxazole-trimethoprim] Anaphylaxis       Objective:     Vitals:    05/22/24 1556   BP: 118/77   Pulse: 67   Temp: 98.2 °F (36.8 °C)     Physical Exam  Vitals reviewed.   Constitutional:       General: He is not in acute distress.     Appearance: Normal appearance. He is not ill-appearing, toxic-appearing or diaphoretic.   HENT:      Head: Normocephalic and atraumatic.   Eyes:      General: No scleral icterus.        Right eye: No discharge.         Left eye: No discharge. "   Pulmonary:      Effort: Pulmonary effort is normal. No respiratory distress.   Abdominal:      Tenderness: There is left CVA tenderness. There is no right CVA tenderness.   Neurological:      General: No focal deficit present.      Mental Status: He is alert.   Psychiatric:         Behavior: Behavior normal.          Assessment/Plan:     1. Acute cystitis with hematuria  - UA with moderate blood, signs of dehydration, and some signs of infection  - repeat urine culture today, prolonged abx therapy as well  - POCT URINALYSIS W/O SCOPE  - nitrofurantoin, macrocrystal-monohydrate, (MACROBID) 100 MG capsule; Take 1 capsule (100 mg total) by mouth 2 (two) times daily. for 14 days  Dispense: 28 capsule; Refill: 0  - Urine culture; Future  - Urine culture    2. Left renal atrophy  - records reviewed with patient as well as lab results  - encouraged pt to keep Urology appt for further evaluation/management        Return to clinic if symptoms persist or sooner if needed.     Dory Deleon MD  Family Medicine  05/22/2024

## 2024-05-23 ENCOUNTER — CLINICAL SUPPORT (OUTPATIENT)
Dept: REHABILITATION | Facility: HOSPITAL | Age: 49
End: 2024-05-23
Payer: COMMERCIAL

## 2024-05-23 DIAGNOSIS — S62.101D CLOSED FRACTURE OF RIGHT WRIST WITH ROUTINE HEALING, SUBSEQUENT ENCOUNTER: Primary | ICD-10-CM

## 2024-05-23 DIAGNOSIS — S82.892A ANKLE FRACTURE, LEFT: ICD-10-CM

## 2024-05-23 DIAGNOSIS — S82.892D CLOSED FRACTURE OF LEFT ANKLE WITH ROUTINE HEALING, SUBSEQUENT ENCOUNTER: ICD-10-CM

## 2024-05-23 DIAGNOSIS — S82.002A LEFT PATELLA FRACTURE: ICD-10-CM

## 2024-05-23 DIAGNOSIS — S62.101A WRIST FRACTURE, RIGHT: ICD-10-CM

## 2024-05-23 DIAGNOSIS — S82.002D CLOSED DISPLACED FRACTURE OF LEFT PATELLA WITH ROUTINE HEALING, UNSPECIFIED FRACTURE MORPHOLOGY, SUBSEQUENT ENCOUNTER: ICD-10-CM

## 2024-05-23 PROCEDURE — 97163 PT EVAL HIGH COMPLEX 45 MIN: CPT
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
## 2024-05-25 LAB — UA COMPLETE W REFLEX CULTURE PNL UR: ABNORMAL

## 2024-06-05 ENCOUNTER — CLINICAL SUPPORT (OUTPATIENT)
Dept: REHABILITATION | Facility: HOSPITAL | Age: 49
End: 2024-06-05
Payer: COMMERCIAL

## 2024-06-05 DIAGNOSIS — S82.002D CLOSED DISPLACED FRACTURE OF LEFT PATELLA WITH ROUTINE HEALING, UNSPECIFIED FRACTURE MORPHOLOGY, SUBSEQUENT ENCOUNTER: Primary | ICD-10-CM

## 2024-06-05 PROBLEM — S82.002A LEFT PATELLA FRACTURE: Status: ACTIVE | Noted: 2024-06-05

## 2024-06-05 PROCEDURE — 97014 ELECTRIC STIMULATION THERAPY: CPT

## 2024-06-05 PROCEDURE — 97110 THERAPEUTIC EXERCISES: CPT

## 2024-06-05 NOTE — PROGRESS NOTES
"  Physical Therapy Treatment Note     Name: Akira Deleon  Clinic Number: [id number]    Therapy Diagnosis: No diagnosis found.  Physician: Eder Sarkar*    Visit Date: 6/5/2024    Physician Orders: PT Eval and Treat "Gait, stretching, strengthening. WBAT all, 2W6"   Medical Diagnosis from Referral: Right Wrist Fx, Left Patella Fx, Left Pilon/Ankle Fx   Evaluation Date: 5/23/2024   Authorization Period Expiration:  7/5/2024     Visit # / Visits authorized: 2/13   PTA Visit #: 0/5    Time In: 0846  Time Out: 0848  Total Billable Time: 60 minutes (pt left to toilet)    Precautions: Standard  Functional Level Prior to Evaluation: pt indep without pain.     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 He states his left ankle is now hurting more than his knee.  He was compliant with home exercise program.  Response to previous treatment: No adverse response voiced.  Functional change: Pt has returned to work.    Pain: 0/10, 3/10, 0/10  Location: left knee, left ankle, right wrist      Objective     Akira received therapeutic exercises to develop strength, endurance, ROM, and flexibility for 45 minutes including:    Nu-Step, Level 3.5 BLE 8 minutes   BAPS brd, seated, L ankle 1 minute CW, 1 minute CCW   Stretch: L ankle inv/ev 2x30" each   Resisted L ankle inv/ev, DF/PF, red tband 20x   Prone HS curls 5# 20x   Prone Hip ext w/knee flexed 2x10   4-way L hip (2# abd 1x20 abd/ext, 1x12 SLR/add   L Hip flexor stretch in prone (hanging off mat) 1x30"   Stretch L hip:Pirif, Glutes, Add 1x30" each, passively   SLR w/VMO (Not today)                                                                   Heard received the following manual therapy techniques: Joint mobilizations, Manual traction, Myofacial release, Soft tissue Mobilization, and TPR were applied to the: xx for 0 minutes, including:      Akira participated in neuromuscular re-education activities to " improve: Balance and Proprioception for 0 minutes. The following activities were included:    Left Single-leg stance                                        Akira received the following direct contact modalities after being cleared for contraindications: Ultrasound:  Akira received ultrasound to manage pain and inflammation at 100 % duty cycle applied to the xx at an intensity of  xx x W/cm2  for a duration of 0 minutes. Patient tolerated treatment well without adverse effects. Therapist was in attendance throughout intervention.    Akira received the following supervised modalities after being cleared for contradictions: IFC Electrical Stimulation:  Akira received IFC Electrical Stimulation for pain control applied to the LEFT KNEE. Pt received stimulation 11.at 100 % scan at 11.5V for 12 minutes/15 min total. Akira tolderated treatment well without any adverse effects.      Akira received hot pack for 0 minutes to xx.    Akira received cold pack for 10 minutes to Left knee and Left ankle during IFC.      Home Exercises Provided and Patient Education Provided     Education provided: Pt instructed in HEP.    Written Home Exercises Provided: yes. Resisted Left ankle DF/PF/INV/EV.  Exercises were reviewed and Akira was able to demonstrate them prior to the end of the session.  Akira demonstrated fair  understanding of the education provided.     See EMR under Media for exercises provided 6/5/2024.    Assessment     Pt issued HEP today to strengthen left quads, HS, glutes, hip abd/add. Pt observed to posture in excessive left hip ER due to tight Piriformis with stretching initiated.    Akira Is progressing well towards his goals.   Pt prognosis is Good.     Pt will continue to benefit from skilled outpatient physical therapy to address the deficits listed in the problem list box on initial evaluation, provide pt/family education and to maximize pt's level of independence in the home and community environment.     Pt's  spiritual, cultural and educational needs considered and pt agreeable to plan of care and goals.     Anticipated barriers to physical therapy: none    Goals:         Short Term Goals: 3 weeks   Pt indep with HEP.  Pt will be assessed for need of heel lift to equalize leg length discrepancy.  Pt will have LLE ROM: knee ext -5 deg, DF 20 deg, ankle inv 22 deg, ankle ev 12 deg.  Pt will have Right wrist ROM: flexion 35 deg, ext 55 deg.  Pt will have LLE strength: knee ext 4/5, DF/PF 4/5, ankle inv/ev 4-/5.  Pt will have Right wrist strength 4/5 throughout.     Long Term Goals: 6 weeks   Pt will be able to negotiate stairs using a reciprocal gait pattern.  Pt will have LLE ROM:  ankle inv 25 deg, ankle ev 15 deg.  Pt will have Right wrist ROM: flexion 60 deg, ext 60 deg.  Pt will have LLE strength: knee ext 4+/5, knee flex 4+/5, DF/PF 4+/5, ankle inv/ev 4/5.  Pt will have Right wrist strength 4+/5 throughout.  Pt will have Tinetti Balance/Gait score of at least 24/28 points.    Plan       NEXT VISIT: Issue 4-way hip for LLE. Initiate Right wrist strengthening.    Plan of care Certification: 5/23/2024 to  7/5/2024.     Outpatient Physical Therapy 2 times weekly for 6 weeks to include the following interventions: Electrical Stimulation , Manual Therapy, Moist Heat/ Ice, Neuromuscular Re-ed, Patient Education, Therapeutic Exercise, and Ultrasound.     Nancy Leiva, PT  6/5/2024

## 2024-06-06 ENCOUNTER — CLINICAL SUPPORT (OUTPATIENT)
Dept: REHABILITATION | Facility: HOSPITAL | Age: 49
End: 2024-06-06
Payer: COMMERCIAL

## 2024-06-06 DIAGNOSIS — S82.002D CLOSED DISPLACED FRACTURE OF LEFT PATELLA WITH ROUTINE HEALING, UNSPECIFIED FRACTURE MORPHOLOGY, SUBSEQUENT ENCOUNTER: Primary | ICD-10-CM

## 2024-06-06 PROCEDURE — 97110 THERAPEUTIC EXERCISES: CPT | Mod: CQ

## 2024-06-06 PROCEDURE — 97014 ELECTRIC STIMULATION THERAPY: CPT | Mod: CQ

## 2024-06-06 NOTE — PROGRESS NOTES
"  Physical Therapy Treatment Note     Name: Akira Deleon  Clinic Number: [id number]    Therapy Diagnosis: No diagnosis found.  Physician: Eder Sarkar*    Visit Date: 6/6/2024    Physician Orders: PT Eval and Treat "Gait, stretching, strengthening. WBAT all, 2W6"   Medical Diagnosis from Referral: Right Wrist Fx, Left Patella Fx, Left Pilon/Ankle Fx   Evaluation Date: 5/23/2024   Authorization Period Expiration:  7/5/2024     Visit # / Visits authorized: 3/13   PTA Visit #: 1/5    Time In: 0759  Time Out: 0901  Total Billable Time: 62 minutes   Precautions: Standard  Functional Level Prior to Evaluation: pt indep without pain.     Subjective     Pt reports: Patient reported pain as noted below.   He was compliant with home exercise program.  Response to previous treatment: Patient stated that he was fatigued in his L leg.  Functional change: Pt has returned to work.    Pain: 1/10, 1-2/10, 0/10  Location: left knee, left ankle, right wrist      Objective     Akira received therapeutic exercises to develop strength, endurance, ROM, and flexibility for 52 minutes including:    Nu-Step, Level 3.5 BLE 8 minutes   BAPS brd, seated, L ankle 1 minute CW, 1 minute CCW   Stretch: L ankle inv/ev 2x30" each   Resisted L ankle inv/ev, DF/PF, red tband 20x (Not today)    Prone HS curls 5# 2x10   Prone Hip ext w/knee flexed 2x10 (Not today)    4-way L hip  1x10    L Hip flexor stretch in prone (hanging off mat) 1 minute    Stretch L hip: Pirif, Glutes, Add 1x30" each (Not today)    SLR w/VMO (Not today)   Seated R HS stretch  30" x2   R wrist flexion, extension, and radial deviation with 3# 15x   Standing calf stretch on wedge  1 minute               Akira received the following manual therapy techniques: Joint mobilizations, Manual traction, Myofacial release, Soft tissue Mobilization, and TPR were applied to the: xx for 0 minutes, including: (Not today)       Rolfe participated in neuromuscular re-education " activities to improve: Balance and Proprioception for 0 minutes. The following activities were included: (Not today)     Left Single-leg stance                                        Akira received the following direct contact modalities after being cleared for contraindications: Ultrasound:  Akira received ultrasound to manage pain and inflammation at 100 % duty cycle applied to the xx at an intensity of  xx x W/cm2  for a duration of 0 minutes. Patient tolerated treatment well without adverse effects. Therapist was in attendance throughout intervention. (Not today)     Akira received the following supervised modalities after being cleared for contradictions: IFC Electrical Stimulation:  Akira received IFC Electrical Stimulation for pain control applied to the LEFT KNEE. Pt received stimulation at 100 % scan at 9.6V for 10 minutes. Akira tolderated treatment well without any adverse effects.      Akira received hot pack for 0 minutes to xx. (Not today)     Akira received cold pack for 10 minutes to Left knee during IFC.      Home Exercises Provided and Patient Education Provided     Education provided: HEP.    Written Home Exercises Provided: yes.  Exercises were reviewed and Akira was able to demonstrate them prior to the end of the session.  Akira demonstrated good  understanding of the education provided.     See EMR under Media for exercises provided 6/5/2024.    Assessment     Patient was provided with visual and verbal cues to promote proper form to decrease compensations, and for proper hold times during therapeutic exercises. After treatment patient stated that his pain was a 3-4/10 in his L ankle, a 1/10 in his L knee, and a 0/10 in his R wrist.     Akira Is progressing well towards his goals.   Pt prognosis is Good.     Pt will continue to benefit from skilled outpatient physical therapy to address the deficits listed in the problem list box on initial evaluation, provide pt/family education and to  maximize pt's level of independence in the home and community environment.     Pt's spiritual, cultural and educational needs considered and pt agreeable to plan of care and goals.     Anticipated barriers to physical therapy: none    Goals:         Short Term Goals: 3 weeks   Pt indep with HEP.  Pt will be assessed for need of heel lift to equalize leg length discrepancy.  Pt will have LLE ROM: knee ext -5 deg, DF 20 deg, ankle inv 22 deg, ankle ev 12 deg.  Pt will have Right wrist ROM: flexion 35 deg, ext 55 deg.  Pt will have LLE strength: knee ext 4/5, DF/PF 4/5, ankle inv/ev 4-/5.  Pt will have Right wrist strength 4/5 throughout.     Long Term Goals: 6 weeks   Pt will be able to negotiate stairs using a reciprocal gait pattern.  Pt will have LLE ROM:  ankle inv 25 deg, ankle ev 15 deg.  Pt will have Right wrist ROM: flexion 60 deg, ext 60 deg.  Pt will have LLE strength: knee ext 4+/5, knee flex 4+/5, DF/PF 4+/5, ankle inv/ev 4/5.  Pt will have Right wrist strength 4+/5 throughout.  Pt will have Tinetti Balance/Gait score of at least 24/28 points.    Plan     Plan of care Certification: 5/23/2024 to  7/5/2024.     Outpatient Physical Therapy 2 times weekly for 6 weeks to include the following interventions: Electrical Stimulation , Manual Therapy, Moist Heat/ Ice, Neuromuscular Re-ed, Patient Education, Therapeutic Exercise, and Ultrasound.     Lady Andersen, PTA  6/6/2024

## 2024-06-11 ENCOUNTER — CLINICAL SUPPORT (OUTPATIENT)
Dept: REHABILITATION | Facility: HOSPITAL | Age: 49
End: 2024-06-11
Payer: COMMERCIAL

## 2024-06-11 DIAGNOSIS — S82.002D CLOSED DISPLACED FRACTURE OF LEFT PATELLA WITH ROUTINE HEALING, UNSPECIFIED FRACTURE MORPHOLOGY, SUBSEQUENT ENCOUNTER: Primary | ICD-10-CM

## 2024-06-11 PROCEDURE — 97112 NEUROMUSCULAR REEDUCATION: CPT

## 2024-06-11 PROCEDURE — 97110 THERAPEUTIC EXERCISES: CPT

## 2024-06-11 NOTE — PROGRESS NOTES
"  Physical Therapy Treatment Note     Name: Akira Deleon  Clinic Number: [id number]    Therapy Diagnosis: No diagnosis found.  Physician: Eder Sarkar*    Visit Date: 6/11/2024    Physician Orders: PT Eval and Treat "Gait, stretching, strengthening. WBAT all, 2W6"   Medical Diagnosis from Referral: Right Wrist Fx, Left Patella Fx, Left Pilon/Ankle Fx   Evaluation Date: 5/23/2024   Authorization Period Expiration:  7/5/2024     Visit # / Visits authorized: 4/13   PTA Visit #: 0/5    Time In: 0748  Time Out: 0841  Total Billable Time: 53 minutes   Precautions: Standard  Functional Level Prior to Evaluation: pt indep without pain.     Subjective     Pt reports: Patient states he walked "a lot" on Sunday. States main problem now is his left ankle in wt bearing, however, pain never goes above a 3/10.  He was compliant with home exercise program.  Response to previous treatment: Patient stated that he was fatigued in his L leg.  Functional change: Can now step up with LLE onto riding .    Pain: 0/10, 1/10, 0/10  Location: left knee, left ankle, right wrist      Objective     MMT                                          EVAL RIGHT          6/11/24  Wrist flexion MMT strength: 4/5    4+/5 MMT strength: 4+/5   Wrist extension MMT strength: 4-/5   4+/5 MMT strength: 4+/5   Ulnar deviation MMT strength: 4-/5   4+/5 MMT strength: 4+/5   Radial deviation MMT strength: 4-/5   4/5 MMT strength: 4+/5       ROM                                EVAL   6/11/24  Wrist flexion 17           80     Wrist extension 48           80     Ulnar deviation 40     Radial deviation 30         Akira received therapeutic exercises to develop strength, endurance, ROM, and flexibility for 43 minutes including: reassessing ROM/MMT right wrist    Nu-Step, Level 3.5 BLE 8 minutes (not today)   BAPS brd, stndg, L ankle 1 minute CW, 1 minute CCW, dbl legs   Stretch: L ankle inv/ev 2x30" each   *Resisted L ankle inv/ev, DF/PF, red " "tband 20x (Not today)    Prone HS curls 5# 2x10 (Not today)    Prone Hip ext w/knee flexed 2x10 (Not today)    *4-way L hip  1x15 each (SLR, ext, add,), 1x20 (abd)   L Hip flexor stretch in prone (hanging off mat) 1x30"   Stretch L hip: Pirif, Glutes, Add 1x30" each    SLR w/VMO 1x10   Seated R HS stretch  2x30"   R wrist flexion, extension, and radial deviation with 7# 1x30 reps   Standing calf stretch -gatroc/soleus 1 minute each   Stretch: R wrist flex/ext 2x30" each           [place] received the following manual therapy techniques: Joint mobilizations, Manual traction, Myofacial release, Soft tissue Mobilization, and TPR were applied to the: xx for 0 minutes, including: (Not today)       Akira participated in neuromuscular re-education activities to improve: Balance and Proprioception for 10 minutes. The following activities were included:     Left Single-leg stance 2x30"   Tandem Standing 1x30"with each foot in front                                   [place] received the following direct contact modalities after being cleared for contraindications: Ultrasound:  Akira received ultrasound to manage pain and inflammation at 100 % duty cycle applied to the xx at an intensity of  xx x W/cm2  for a duration of 0 minutes. Patient tolerated treatment well without adverse effects. Therapist was in attendance throughout intervention. (Not today)     [place] received the following supervised modalities after being cleared for contradictions: IFC Electrical Stimulation:  Akira received IFC Electrical Stimulation for pain control applied to the LEFT KNEE. Pt received stimulation at 100 % scan at 9.6V for 10 minutes. [place] tolderated treatment well without any adverse effects.  (Not today)     [place] received hot pack for 0 minutes to xx. (Not today)     [place] received cold pack for 6 minutes to Left ankle and knee.      Home Exercises Provided and Patient Education Provided     Education provided: HEP.    Written Home Exercises " Provided: yes. Wrist stretching and strengthening  Exercises were reviewed and Akira was able to demonstrate them prior to the end of the session.  Akira demonstrated good  understanding of the education provided.     See EMR under Media for exercises provided 6/5/2024.    Assessment     Patient still having trouble with quad control during SLR and SLR w/VMO. Pt met STG 2 and 6 and LTG 3.    Akira Is progressing well towards his goals.   Pt prognosis is Good.     Pt will continue to benefit from skilled outpatient physical therapy to address the deficits listed in the problem list box on initial evaluation, provide pt/family education and to maximize pt's level of independence in the home and community environment.     Pt's spiritual, cultural and educational needs considered and pt agreeable to plan of care and goals.     Anticipated barriers to physical therapy: none    Goals:         Short Term Goals: 3 weeks   Pt indep with HEP.-PROGRESSED  Pt will be assessed for need of heel lift to equalize leg length discrepancy.  Pt will have LLE ROM: knee ext -5 deg, DF 20 deg, ankle inv 22 deg, ankle ev 12 deg.  Pt will have Right wrist ROM: flexion 35 deg, ext 55 deg.-MET  Pt will have LLE strength: knee ext 4/5, DF/PF 4/5, ankle inv/ev 4-/5.  Pt will have Right wrist strength 4/5 throughout.-MET     Long Term Goals: 6 weeks   Pt will be able to negotiate stairs using a reciprocal gait pattern.-MET FOR [facility] ONLY  Pt will have LLE ROM:  ankle inv 25 deg, ankle ev 15 deg.  Pt will have Right wrist ROM: flexion 60 deg, ext 60 deg.-MET  Pt will have LLE strength: knee ext 4+/5, knee flex 4+/5, DF/PF 4+/5, ankle inv/ev 4/5.  Pt will have Right wrist strength 4+/5 throughout.-MET EXCEPT RADIAL DEV  Pt will have Tinetti Balance/Gait score of at least 24/28 points.    Plan     Plan of care Certification: 5/23/2024 to  7/5/2024.     Outpatient Physical Therapy 2 times weekly for 6 weeks to include the following  interventions: Electrical Stimulation , Manual Therapy, Moist Heat/ Ice, Neuromuscular Re-ed, Patient Education, Therapeutic Exercise, and Ultrasound.     Nancy Leiva, PT  6/11/2024

## 2024-06-13 ENCOUNTER — CLINICAL SUPPORT (OUTPATIENT)
Dept: REHABILITATION | Facility: HOSPITAL | Age: 49
End: 2024-06-13
Payer: COMMERCIAL

## 2024-06-13 DIAGNOSIS — S82.002D CLOSED DISPLACED FRACTURE OF LEFT PATELLA WITH ROUTINE HEALING, UNSPECIFIED FRACTURE MORPHOLOGY, SUBSEQUENT ENCOUNTER: Primary | ICD-10-CM

## 2024-06-13 PROCEDURE — 97035 APP MDLTY 1+ULTRASOUND EA 15: CPT

## 2024-06-13 PROCEDURE — 97014 ELECTRIC STIMULATION THERAPY: CPT

## 2024-06-13 PROCEDURE — 97110 THERAPEUTIC EXERCISES: CPT

## 2024-06-13 PROCEDURE — 97140 MANUAL THERAPY 1/> REGIONS: CPT

## 2024-06-13 NOTE — PROGRESS NOTES
"  Physical Therapy Treatment Note     Name: Akira Deleon  Clinic Number: [id number]    Therapy Diagnosis: No diagnosis found.  Physician: Eder Sarkar*    Visit Date: 6/13/2024    Physician Orders: PT Eval and Treat "Gait, stretching, strengthening. WBAT all, 2W6"   Medical Diagnosis from Referral: Right Wrist Fx, Left Patella Fx, Left Pilon/Ankle Fx   Evaluation Date: 5/23/2024   Authorization Period Expiration:  7/5/2024     Visit # / Visits authorized: 5/13   PTA Visit #: 0/5    Time In: 0800  Time Out: 0910  Total Billable Time: 70 minutes   Precautions: Standard  Functional Level Prior to Evaluation: pt indep without pain.     Subjective     Pt reports: Patient limping this morning and c/o increased left ankle pain.States he is walking more at work. States he is wearing flat shoes without socks due to blister and hypersensitivity.    He was compliant with home exercise program.  Response to previous treatment: Patient stated that he was fatigued in his L leg.  Functional change: Can now step up with LLE onto riding .    Pain: 0/10, 7/10, 0/10  Location: left knee, left ankle, right wrist      Objective     MMT                                          EVAL RIGHT          6/11/24  Wrist flexion MMT strength: 4/5    4+/5 MMT strength: 4+/5   Wrist extension MMT strength: 4-/5   4+/5 MMT strength: 4+/5   Ulnar deviation MMT strength: 4-/5   4+/5 MMT strength: 4+/5   Radial deviation MMT strength: 4-/5   4/5 MMT strength: 4+/5       ROM                                EVAL   6/11/24  Wrist flexion 17           80     Wrist extension 48           80     Ulnar deviation 40     Radial deviation 30         Akira received therapeutic exercises to develop strength, endurance, ROM, and flexibility for 15 minutes including: reassessing ROM/MMT right wrist    Nu-Step, Level 3.5 BLE 8 minutes (not today)   BAPS brd, stndg, L ankle 1 minute CW, 1 minute CCW, dbl legs   Stretch: L ankle inv/ev 2x30" each " "(not today)   *Resisted L ankle inv/ev, DF/PF, red tband 20x (Not today)    Prone HS curls 5# 2x10 (Not today)    Prone Hip ext w/knee flexed 2x10 (Not today)    *4-way L hip  1x15 each (SLR, ext, add,), 1x20 (abd)   L Hip flexor stretch in prone (hanging off mat) 1x30" (not today)   Stretch L hip: Pirif, Glutes, Add 1x30" each (not today)   SLR w/VMO 1x10 (not today)   Seated R HS stretch  2x30" (not today)   R wrist flexion, extension, and radial deviation with 7# 1x30 reps (not today)   Standing calf stretch -gatroc/soleus 1 minute each (not today)   Stretch: R wrist flex/ext 2x30" each (not today)   BAPS brd, seated, CW/CCW, DF/PF, INV/EV 1 min each dir     Arch Lifts3" hold 15x   G.Toe ext 15x   Ext toes 2-5 15x                                       Akria received the following manual therapy techniques: Joint mobilizations, Manual traction, Myofacial release, Soft tissue Mobilization, and TPR were applied to the: LEFT ANKLE for 35 minutes, including: STM/TPR to plantar arch, retrograde massage to dorsum foot, STM/MRF to medial and lateral incisions.      Akira participated in neuromuscular re-education activities to improve: Balance and Proprioception for xx minutes. The following activities were included:     Left Single-leg stance 2x30"   Tandem Standing 1x30"with each foot in front                                   Akira received the following direct contact modalities after being cleared for contraindications: Ultrasound:  Akira received ultrasound to manage pain and inflammation at 100 % duty cycle applied to the right plantar arch at an intensity of  1.5 W/cm2  for a duration of 7 minutes/8 total. Patient tolerated treatment well without adverse effects. Therapist was in attendance throughout intervention.     Akira received the following supervised modalities after being cleared for contradictions: IFC Electrical Stimulation:  Akira received IFC Electrical Stimulation for pain control applied to the " LEFT ANKLE. Pt received stimulation at 100 % scan at 24V for 10 minutes/12 total. Akira tolderated treatment well without any adverse effects.      Akira received hot pack for 0 minutes to xx. (Not today)     Akira received cold pack for 10 minutes to Left ankle.      Home Exercises Provided and Patient Education Provided     Education provided: pt instructed in scar massage and simple MFR to amando ankle incisions. Instructed to ice left ankle during his lunch breaks, wear shoes with proper support/arch to decrease pressure forces from ankle.    Written Home Exercises Provided: Patient instructed to cont prior HEP. Wrist stretching and strengthening  Exercises were reviewed and Akira was able to demonstrate them prior to the end of the session.  Akira demonstrated good  understanding of the education provided.     See EMR under Media for exercises provided 6/5/2024.    Assessment     Patient had a significant increase in left ankle pain from increased walking while at work yesterday. Therefore, treatment focused on left ankle. Pt initiated strengthening of intrinsics of left foot. He is hypersensitive in both ankle incisions and has scar adhesions observed. Pt's left ankle pain decreased post-treatment from 7/10 to 5/10.    Akira Is progressing well towards his goals.   Pt prognosis is Good.     Pt will continue to benefit from skilled outpatient physical therapy to address the deficits listed in the problem list box on initial evaluation, provide pt/family education and to maximize pt's level of independence in the home and community environment.     Pt's spiritual, cultural and educational needs considered and pt agreeable to plan of care and goals.     Anticipated barriers to physical therapy: none    Goals:         Short Term Goals: 3 weeks   Pt indep with HEP.-PROGRESSED  Pt will be assessed for need of heel lift to equalize leg length discrepancy.  Pt will have LLE ROM: knee ext -5 deg, DF 20 deg, ankle inv 22  deg, ankle ev 12 deg.  Pt will have Right wrist ROM: flexion 35 deg, ext 55 deg.-MET  Pt will have LLE strength: knee ext 4/5, DF/PF 4/5, ankle inv/ev 4-/5.  Pt will have Right wrist strength 4/5 throughout.-MET     Long Term Goals: 6 weeks   Pt will be able to negotiate stairs using a reciprocal gait pattern.-MET FOR [facility] ONLY  Pt will have LLE ROM:  ankle inv 25 deg, ankle ev 15 deg.  Pt will have Right wrist ROM: flexion 60 deg, ext 60 deg.-MET  Pt will have LLE strength: knee ext 4+/5, knee flex 4+/5, DF/PF 4+/5, ankle inv/ev 4/5.  Pt will have Right wrist strength 4+/5 throughout.-MET EXCEPT RADIAL DEV  Pt will have Tinetti Balance/Gait score of at least 24/28 points.    Plan     NEXT VISIT: cont with ankle treatment, Kinesio tape left ankle.    Plan of care Certification: 5/23/2024 to  7/5/2024.     Outpatient Physical Therapy 2 times weekly for 6 weeks to include the following interventions: Electrical Stimulation , Manual Therapy, Moist Heat/ Ice, Neuromuscular Re-ed, Patient Education, Therapeutic Exercise, and Ultrasound.     Nancy Leiva, PT  6/13/2024

## 2024-06-18 ENCOUNTER — CLINICAL SUPPORT (OUTPATIENT)
Dept: REHABILITATION | Facility: HOSPITAL | Age: 49
End: 2024-06-18
Payer: COMMERCIAL

## 2024-06-18 DIAGNOSIS — S82.002D CLOSED DISPLACED FRACTURE OF LEFT PATELLA WITH ROUTINE HEALING, UNSPECIFIED FRACTURE MORPHOLOGY, SUBSEQUENT ENCOUNTER: Primary | ICD-10-CM

## 2024-06-18 PROCEDURE — 97140 MANUAL THERAPY 1/> REGIONS: CPT

## 2024-06-18 PROCEDURE — 97110 THERAPEUTIC EXERCISES: CPT

## 2024-06-18 NOTE — PROGRESS NOTES
"  Physical Therapy Treatment Note     Name: Akira Deleon  Clinic Number: [id number]    Therapy Diagnosis: No diagnosis found.  Physician: Eder Sarkar*    Visit Date: 6/18/2024    Physician Orders: PT Eval and Treat "Gait, stretching, strengthening. WBAT all, 2W6"   Medical Diagnosis from Referral: Right Wrist Fx, Left Patella Fx, Left Pilon/Ankle Fx   Evaluation Date: 5/23/2024   Authorization Period Expiration:  7/5/2024     Visit # / Visits authorized: 6/13   PTA Visit #: 0/5    Time In: 0816  Time Out: 0907  Total Billable Time: 51 minutes   Precautions: Standard  Functional Level Prior to Evaluation: pt indep without pain.     Subjective     Pt reports: Patient states that he has changed his footwear to one with an arch and good rubber sole which is more comfortable and doesn't rub his incision. He states his ankle is feeling better, but he also has not worked since Wed last week.    He was compliant with home exercise program.  Response to previous treatment: Decreased hypersensitivity to ankle scars.  Functional change: Can now step up with LLE onto riding .    Pain: 1/10, 0-1/10, 0/10  Location: left knee, left ankle, right wrist      Objective     Range of motion:  Motion Right Left  EVAL            6/18/24   Knee extension   -12                             -4   Knee flexion   145                           150   Ankle DF   12                               18   Ankle PF   35                               55   Ankle Inversion   17                               23   Ankle Eversion   7                                 25         Manual muscle test:  Muscle Right  Left EVAL              6/18/24   Knee extension  MMT strength: 5/5  MMT strength: 3+/5   4/5   Knee flexion  MMT strength: 4+/5  MMT strength: 4/5     4+/5   Ankle DF  MMT strength: 4+/5  MMT strength: 3+/5    4/5   Ankle PF  MMT strength: 4+/5  MMT strength: 4-/5     4+/5   Ankle inversion  MMT strength: 4+/5  MMT strength: " "3+/5    4-/5   Ankle eversion  MMT strength: 4+/5  MMT strength: 3+/5    4-/5          [place] received therapeutic exercises to develop strength, endurance, ROM, and flexibility for 36 minutes including: reassessing ROM/MMT left knee and ankle    Nu-Step, Level 3.5 BLE 8 minutes (not today)   BAPS brd, stndg, L ankle 1 minute CW, 1 minute CCW, dbl legs   Stretch: L ankle inv/ev 2x30" each    *Resisted L ankle inv/ev, DF/PF, red tband 20x (Not today)    Prone HS curls 5# 2x10 (Not today)    Prone Hip ext w/knee flexed 2x10 (Not today)    *4-way L hip  1x15 each (SLR, ext, add,), 1x20 (abd)   L Hip flexor stretch in prone (hanging off mat) 1x30" (not today)   Stretch L hip: Pirif, Glutes, Add 1x30" each (not today)   SLR w/VMO 1x10 (not today)   Seated R HS stretch  2x30"    R wrist flexion, extension, and radial deviation with 7# 1x30 reps (not today)   Standing calf stretch -gatroc/soleus 1 minute each    Stretch: R wrist flex/ext 2x30" each (not today)   BAPS brd, seated, CW/CCW, DF/PF, INV/EV 1 min each dir (not today)     Arch Lifts3" hold 15x (not today)   G.Toe ext 15x (not today)   Ext toes 2-5 15x (not today)   Step Ups 4" 2x10                                   [place] received the following manual therapy techniques: Joint mobilizations, Manual traction, Myofacial release, Soft tissue Mobilization, and TPR were applied to the: LEFT ANKLE for 15 minutes, including: STM/TPR to plantar arch, STM/MRF to medial and lateral incisions.      [place] participated in neuromuscular re-education activities to improve: Balance and Proprioception for xx minutes. The following activities were included:     Left Single-leg stance 2x30" (not today)   Tandem Standing 1x30"with each foot in front (not today)                                   [place] received the following direct contact modalities after being cleared for contraindications: Ultrasound:  [place] received ultrasound to manage pain and inflammation at 100 % duty cycle " applied to the right plantar arch at an intensity of  1.5 W/cm2  for a duration of 7 minutes/8 total. Patient tolerated treatment well without adverse effects. Therapist was in attendance throughout intervention. (Not today)     Akira received the following supervised modalities after being cleared for contradictions: IFC Electrical Stimulation:  Akira received IFC Electrical Stimulation for pain control applied to the LEFT ANKLE. Pt received stimulation at 100 % scan at 24V for 10 minutes/12 total. [place] tolderated treatment well without any adverse effects.  (Not today)     Akira received hot pack for 0 minutes to xx. (Not today)     Akira received cold pack for 10 minutes to Left ankle.      Home Exercises Provided and Patient Education Provided     Education provided: pt instructed in scar massage and simple MFR to aamndo ankle incisions. Instructed to ice left ankle during his lunch breaks, wear shoes with proper support/arch to decrease pressure forces from ankle.    Written Home Exercises Provided: Patient instructed to cont prior HEP. Wrist stretching and strengthening  Exercises were reviewed and Akira was able to demonstrate them prior to the end of the session.  Akira demonstrated good  understanding of the education provided.     See EMR under Media for exercises provided 6/5/2024.    Assessment     Patient's hypersensitive in both ankle incisions are resolving. Pt has met STG 1,4,5,6. Will assess next visit if pt's ankle needs to be kinesio taped after he works with these new shoes he is now wearing.    Akira Is progressing well towards his goals.   Pt prognosis is Good.     Pt will continue to benefit from skilled outpatient physical therapy to address the deficits listed in the problem list box on initial evaluation, provide pt/family education and to maximize pt's level of independence in the home and community environment.     Pt's spiritual, cultural and educational needs considered and pt agreeable  to plan of care and goals.     Anticipated barriers to physical therapy: none    Goals:         Short Term Goals: 3 weeks   Pt indep with HEP.-MET  Pt will be assessed for need of heel lift to equalize leg length discrepancy.  Pt will have LLE ROM: knee ext -5 deg, DF 20 deg, ankle inv 22 deg, ankle ev 12 deg.-MET EXCEPT DF  Pt will have Right wrist ROM: flexion 35 deg, ext 55 deg.-MET  Pt will have LLE strength: knee ext 4/5, DF/PF 4/5, ankle inv/ev 4-/5.-MET  Pt will have Right wrist strength 4/5 throughout.-MET     Long Term Goals: 6 weeks   Pt will be able to negotiate stairs using a reciprocal gait pattern.-MET FOR [facility] ONLY  Pt will have LLE ROM:  ankle inv 25 deg, ankle ev 15 deg.  Pt will have Right wrist ROM: flexion 60 deg, ext 60 deg.-MET  Pt will have LLE strength: knee ext 4+/5, knee flex 4+/5, DF/PF 4+/5, ankle inv/ev 4/5.  Pt will have Right wrist strength 4+/5 throughout.-MET EXCEPT RADIAL DEV  Pt will have Tinetti Balance/Gait score of at least 24/28 points.    Plan     NEXT VISIT: cont with ankle and quat treatment, assess need to Kinesio tape left ankle, assess need for left heel lift.    Plan of care Certification: 5/23/2024 to  7/5/2024.     Outpatient Physical Therapy 2 times weekly for 6 weeks to include the following interventions: Electrical Stimulation , Manual Therapy, Moist Heat/ Ice, Neuromuscular Re-ed, Patient Education, Therapeutic Exercise, and Ultrasound.     Nancy Leiva, PT  6/18/2024

## 2024-06-18 NOTE — PROGRESS NOTES
"  Physical Therapy Treatment Note     Name: Akira Deleon  Clinic Number: [id number]    Therapy Diagnosis: No diagnosis found.  Physician: Eder Sarkar*    Visit Date: 6/18/2024    Physician Orders: PT Eval and Treat "Gait, stretching, strengthening. WBAT all, 2W6"   Medical Diagnosis from Referral: Right Wrist Fx, Left Patella Fx, Left Pilon/Ankle Fx   Evaluation Date: 5/23/2024   Authorization Period Expiration:  7/5/2024     Visit # / Visits authorized: 6/13   PTA Visit #: 0/5    Time In: 0816  Time Out: 0907  Total Billable Time: xx minutes   Precautions: Standard  Functional Level Prior to Evaluation: pt indep without pain.     Subjective     Pt reports: Patient limping this morning and c/o increased left ankle pain.States he is walking more at work. States he is wearing flat shoes without socks due to blister and hypersensitivity.    He was compliant with home exercise program.  Response to previous treatment: Patient stated that he was fatigued in his L leg.  Functional change: Can now step up with LLE onto riding .    Pain: 1/10, 0-1/10, 0/10  Location: left knee, left ankle, right wrist      Objective     Range of motion:  Motion Right Left  EVAL            6/18/24   Knee extension   -12                             -4   Knee flexion   145                           150   Ankle DF   12                               18   Ankle PF   35                               55   Ankle Inversion   17                               23   Ankle Eversion   7                                 25         Manual muscle test:  Muscle Right  Left EVAL              6/18/24   Knee extension  MMT strength: 5/5  MMT strength: 3+/5   4/5   Knee flexion  MMT strength: 4+/5  MMT strength: 4/5     4+/5   Ankle DF  MMT strength: 4+/5  MMT strength: 3+/5    4/5   Ankle PF  MMT strength: 4+/5  MMT strength: 4-/5     4+/5   Ankle inversion  MMT strength: 4+/5  MMT strength: 3+/5    4-/5   Ankle eversion  MMT strength: " "4+/5  MMT strength: 3+/5    4-/5          [place] received therapeutic exercises to develop strength, endurance, ROM, and flexibility for xx minutes including: reassessing ROM/MMT left knee and ankle    Nu-Step, Level 3.5 BLE 8 minutes (not today)   BAPS brd, stndg, L ankle 1 minute CW, 1 minute CCW, dbl legs   Stretch: L ankle inv/ev 2x30" each    *Resisted L ankle inv/ev, DF/PF, red tband 20x (Not today)    Prone HS curls 5# 2x10 (Not today)    Prone Hip ext w/knee flexed 2x10 (Not today)    *4-way L hip  1x15 each (SLR, ext, add,), 1x20 (abd)   L Hip flexor stretch in prone (hanging off mat) 1x30" (not today)   Stretch L hip: Pirif, Glutes, Add 1x30" each (not today)   SLR w/VMO 1x10 (not today)   Seated R HS stretch  2x30"    R wrist flexion, extension, and radial deviation with 7# 1x30 reps (not today)   Standing calf stretch -gatroc/soleus 1 minute each    Stretch: R wrist flex/ext 2x30" each (not today)   BAPS brd, seated, CW/CCW, DF/PF, INV/EV 1 min each dir (not today)     Arch Lifts3" hold 15x (not today)   G.Toe ext 15x (not today)   Ext toes 2-5 15x (not today)   Step Ups 4" 2x10                                   [place] received the following manual therapy techniques: Joint mobilizations, Manual traction, Myofacial release, Soft tissue Mobilization, and TPR were applied to the: LEFT ANKLE for 35 minutes, including: STM/TPR to plantar arch, retrograde massage to dorsum foot, STM/MRF to medial and lateral incisions.      Akira participated in neuromuscular re-education activities to improve: Balance and Proprioception for xx minutes. The following activities were included:     Left Single-leg stance 2x30"   Tandem Standing 1x30"with each foot in front                                   [place] received the following direct contact modalities after being cleared for contraindications: Ultrasound:  [place] received ultrasound to manage pain and inflammation at 100 % duty cycle applied to the right plantar arch at " an intensity of  1.5 W/cm2  for a duration of 7 minutes/8 total. Patient tolerated treatment well without adverse effects. Therapist was in attendance throughout intervention.     Akira received the following supervised modalities after being cleared for contradictions: IFC Electrical Stimulation:  Akira received IFC Electrical Stimulation for pain control applied to the LEFT ANKLE. Pt received stimulation at 100 % scan at 24V for 10 minutes/12 total. Akira tolderated treatment well without any adverse effects.      Akira received hot pack for 0 minutes to xx. (Not today)     Akira received cold pack for 10 minutes to Left ankle.      Home Exercises Provided and Patient Education Provided     Education provided: pt instructed in scar massage and simple MFR to amando ankle incisions. Instructed to ice left ankle during his lunch breaks, wear shoes with proper support/arch to decrease pressure forces from ankle.    Written Home Exercises Provided: Patient instructed to cont prior HEP. Wrist stretching and strengthening  Exercises were reviewed and Akira was able to demonstrate them prior to the end of the session.  Akira demonstrated good  understanding of the education provided.     See EMR under Media for exercises provided 6/5/2024.    Assessment     Patient had a significant increase in left ankle pain from increased walking while at work yesterday. Therefore, treatment focused on left ankle. Pt initiated strengthening of intrinsics of left foot. He is hypersensitive in both ankle incisions and has scar adhesions observed. Pt's left ankle pain decreased post-treatment from 7/10 to 5/10.    Akira Is progressing well towards his goals.   Pt prognosis is Good.     Pt will continue to benefit from skilled outpatient physical therapy to address the deficits listed in the problem list box on initial evaluation, provide pt/family education and to maximize pt's level of independence in the home and community environment.      Pt's spiritual, cultural and educational needs considered and pt agreeable to plan of care and goals.     Anticipated barriers to physical therapy: none    Goals:         Short Term Goals: 3 weeks   Pt indep with HEP.-PROGRESSED  Pt will be assessed for need of heel lift to equalize leg length discrepancy.  Pt will have LLE ROM: knee ext -5 deg, DF 20 deg, ankle inv 22 deg, ankle ev 12 deg.-MET EXCEPT DF  Pt will have Right wrist ROM: flexion 35 deg, ext 55 deg.-MET  Pt will have LLE strength: knee ext 4/5, DF/PF 4/5, ankle inv/ev 4-/5.-MET  Pt will have Right wrist strength 4/5 throughout.-MET     Long Term Goals: 6 weeks   Pt will be able to negotiate stairs using a reciprocal gait pattern.-MET FOR [facility] ONLY  Pt will have LLE ROM:  ankle inv 25 deg, ankle ev 15 deg.  Pt will have Right wrist ROM: flexion 60 deg, ext 60 deg.-MET  Pt will have LLE strength: knee ext 4+/5, knee flex 4+/5, DF/PF 4+/5, ankle inv/ev 4/5.  Pt will have Right wrist strength 4+/5 throughout.-MET EXCEPT RADIAL DEV  Pt will have Tinetti Balance/Gait score of at least 24/28 points.    Plan     NEXT VISIT: cont with ankle treatment, Kinesio tape left ankle.    Plan of care Certification: 5/23/2024 to  7/5/2024.     Outpatient Physical Therapy 2 times weekly for 6 weeks to include the following interventions: Electrical Stimulation , Manual Therapy, Moist Heat/ Ice, Neuromuscular Re-ed, Patient Education, Therapeutic Exercise, and Ultrasound.     Nancy Leiva, PT  6/18/2024

## 2024-06-20 ENCOUNTER — CLINICAL SUPPORT (OUTPATIENT)
Dept: REHABILITATION | Facility: HOSPITAL | Age: 49
End: 2024-06-20
Payer: COMMERCIAL

## 2024-06-20 DIAGNOSIS — S82.002D CLOSED DISPLACED FRACTURE OF LEFT PATELLA WITH ROUTINE HEALING, UNSPECIFIED FRACTURE MORPHOLOGY, SUBSEQUENT ENCOUNTER: Primary | ICD-10-CM

## 2024-06-20 PROCEDURE — 97140 MANUAL THERAPY 1/> REGIONS: CPT | Mod: CQ

## 2024-06-20 PROCEDURE — 97110 THERAPEUTIC EXERCISES: CPT | Mod: CQ

## 2024-06-20 NOTE — PROGRESS NOTES
"  Physical Therapy Treatment Note     Name: Akira Deleon  Clinic Number: [id number]    Therapy Diagnosis: No diagnosis found.  Physician: Eder Sarkar*    Visit Date: 6/20/2024    Physician Orders: PT Eval and Treat "Gait, stretching, strengthening. WBAT all, 2W6"   Medical Diagnosis from Referral: Right Wrist Fx, Left Patella Fx, Left Pilon/Ankle Fx   Evaluation Date: 5/23/2024   Authorization Period Expiration:  7/5/2024     Visit # / Visits authorized: 7/13   PTA Visit #: 1/5    Time In: 0802  Time Out: 0855  Total Billable Time: 53 minutes   Precautions: Standard  Functional Level Prior to Evaluation: pt indep without pain.     Subjective     Pt reports: Patient reported that as noted below.     He was compliant with home exercise program.  Functional change: Can now step up with LLE onto riding .    Pain: 1/10, 1/10, 1/10  Location: left knee, left ankle, right wrist      Objective      Akira received therapeutic exercises to develop strength, endurance, ROM, and flexibility for 35 minutes including:     Nu-Step, Level 3.5 BLE only  8 minutes    BAPS brd, stndg, L ankle 1 minute CW, 1 minute CCW, dbl legs   Stretch: L ankle inv/ev 2x30" each    *Resisted L ankle inv/ev, DF/PF, red tband 20x (Not today)    Prone HS curls 5# 2x10 (Not today)    Prone Hip ext w/knee flexed 2x10 (Not today)    *4-way L hip  1x15 each (SLR, ext, add,), 1x20 (abd)   L Hip flexor stretch in prone (hanging off mat) 1x30" (not today)   Stretch L hip: Pirif, Glutes, Add 1x30" each (not today)   SLR w/VMO 1x10 (not today)   Seated R HS stretch  2x30"    R wrist flexion, extension, and radial deviation with 7# 1x30 reps (not today)   Standing calf stretch -gatroc/soleus 1 minute each    Stretch: R wrist flex/ext 2x30" each (not today)   BAPS brd, seated, CW/CCW, DF/PF, INV/EV 1 min each dir (not today)     Arch Lifts3" hold 15x (not today)   G.Toe ext 15x (not today)   Ext toes 2-5 15x (not today)   Step Ups 4" " "2x10 (not to                                   [place] received the following manual therapy techniques: Myofacial release, Soft tissue Mobilization, and TPR were applied to the: LEFT ANKLE for 10 minutes, including: STM/TPR to plantar fascia and STM/MRF to medial and lateral incisions.      [place] participated in neuromuscular re-education activities to improve: Balance and Proprioception for xx minutes. The following activities were included:     Left Single-leg stance 2x30" (not today)   Tandem Standing 1x30"with each foot in front (not today)                                   Akira received the following direct contact modalities after being cleared for contraindications: Ultrasound:  [place] received ultrasound to manage pain and inflammation at 100 % duty cycle applied to the right plantar arch at an intensity of  1.5 W/cm2  for a duration of 0 minutes. Patient tolerated treatment well without adverse effects. Therapist was in attendance throughout intervention. (Not today)     Akira received the following supervised modalities after being cleared for contradictions: IFC Electrical Stimulation:  [place] received IFC Electrical Stimulation for pain control applied to the LEFT ANKLE. Pt received stimulation at 100 % scan at 24V for 10 minutes/12 total. [place] tolderated treatment well without any adverse effects.  (Not today)     Akira received hot pack for 0 minutes to xx. (Not today)     Akira received cold pack for 8 minutes to Left ankle.      Home Exercises Provided and Patient Education Provided     Written Home Exercises Provided: Patient instructed to cont prior HEP. Wrist stretching and strengthening  Exercises were reviewed and Akira was able to demonstrate them prior to the end of the session.  [place] demonstrated good  understanding of the education provided.     See EMR under Media for exercises provided 6/5/2024.    Assessment     Patient was provided with visual and verbal cues to promote proper form and " hold times of therapeutic exercises. After treatment patient stated that his ankle pain was a 0/10, but he stated that his ankle was tender. He also stated that his pain was a 0/10 in his L knee, and a 0/10 in his R wrist but he stated that his wrist was tender    [facility] Is progressing well towards his goals.   Pt prognosis is Good.     Pt will continue to benefit from skilled outpatient physical therapy to address the deficits listed in the problem list box on initial evaluation, provide pt/family education and to maximize pt's level of independence in the home and community environment.     Pt's spiritual, cultural and educational needs considered and pt agreeable to plan of care and goals.     Anticipated barriers to physical therapy: none    Goals:         Short Term Goals: 3 weeks   Pt indep with HEP.-MET  Pt will be assessed for need of heel lift to equalize leg length discrepancy.  Pt will have LLE ROM: knee ext -5 deg, DF 20 deg, ankle inv 22 deg, ankle ev 12 deg.-MET EXCEPT DF  Pt will have Right wrist ROM: flexion 35 deg, ext 55 deg.-MET  Pt will have LLE strength: knee ext 4/5, DF/PF 4/5, ankle inv/ev 4-/5.-MET  Pt will have Right wrist strength 4/5 throughout.-MET     Long Term Goals: 6 weeks   Pt will be able to negotiate stairs using a reciprocal gait pattern.-MET FOR [facility] ONLY  Pt will have LLE ROM:  ankle inv 25 deg, ankle ev 15 deg.  Pt will have Right wrist ROM: flexion 60 deg, ext 60 deg.-MET  Pt will have LLE strength: knee ext 4+/5, knee flex 4+/5, DF/PF 4+/5, ankle inv/ev 4/5.  Pt will have Right wrist strength 4+/5 throughout.-MET EXCEPT RADIAL DEV  Pt will have Tinetti Balance/Gait score of at least 24/28 points.    Plan     Plan of care Certification: 5/23/2024 to  7/5/2024.     Outpatient Physical Therapy 2 times weekly for 6 weeks to include the following interventions: Electrical Stimulation , Manual Therapy, Moist Heat/ Ice, Neuromuscular Re-ed, Patient Education, Therapeutic  Exercise, and Ultrasound.     Lady Andersen, PTA  6/20/2024

## 2024-06-25 ENCOUNTER — CLINICAL SUPPORT (OUTPATIENT)
Dept: REHABILITATION | Facility: HOSPITAL | Age: 49
End: 2024-06-25
Payer: COMMERCIAL

## 2024-06-25 DIAGNOSIS — S82.002D CLOSED DISPLACED FRACTURE OF LEFT PATELLA WITH ROUTINE HEALING, UNSPECIFIED FRACTURE MORPHOLOGY, SUBSEQUENT ENCOUNTER: Primary | ICD-10-CM

## 2024-06-25 PROCEDURE — 97110 THERAPEUTIC EXERCISES: CPT | Mod: CQ

## 2024-06-25 PROCEDURE — 97140 MANUAL THERAPY 1/> REGIONS: CPT | Mod: CQ

## 2024-06-25 NOTE — PROGRESS NOTES
"  Physical Therapy Treatment Note     Name: Akira Deleon  Clinic Number: [id number]    Therapy Diagnosis: No diagnosis found.  Physician: Eder Sarkar*    Visit Date: 6/25/2024    Physician Orders: PT Eval and Treat "Gait, stretching, strengthening. WBAT all, 2W6"   Medical Diagnosis from Referral: Right Wrist Fx, Left Patella Fx, Left Pilon/Ankle Fx   Evaluation Date: 5/23/2024   Authorization Period Expiration:  7/5/2024     Visit # / Visits authorized: 8/13   PTA Visit #: 2/5    Time In: 0807  Time Out: 0857  Total Billable Time: 50 minutes   Precautions: Standard  Functional Level Prior to Evaluation: pt indep without pain.     Subjective     Pt reports: Patient reported that he worked Friday, Saturday, Sunday and yesterday, and he is sore today.    He was compliant with home exercise program.  Response to previous treatment: Soreness   Functional change: Can now step up with LLE onto riding .    Pain: 2/10, 2-2.5/10, 2/10  Location: left knee, left ankle, right wrist      Objective      Akira received therapeutic exercises to develop strength, endurance, ROM, and flexibility for 32 minutes including:     Nu-Step, Level 3.5 BLE only  8 minutes    BAPS brd, stndg, L ankle 1 minute CW, 1 minute CCW, dbl legs   Stretch: L ankle inv/ev 2x30" each    *Resisted L ankle inv/ev, DF/PF, red tband 20x (Not today)    Prone HS curls 5# 2x10 (Not today)    Prone Hip ext w/knee flexed 2x10 (Not today)    *4-way L hip  1x15 each (SLR, ext, add,), 1x20 (abd)   L Hip flexor stretch in prone (hanging off mat) 1x30" (not today)   Stretch L hip: Pirif, Glutes, Add 1x30" each (not today)   SLR w/VMO 1x10 (not today)   Seated R HS stretch  2x30"    R wrist flexion, extension, and radial deviation with 7# 1x30 reps (not today)   Standing calf stretch - gatroc/soleus 1 minute each    Stretch: R wrist flex/ext 2x30" each (not today)   BAPS brd, seated, CW/CCW, DF/PF, INV/EV 1 min each dir (not today)     Arch " "Lifts 3" hold 15x    G.Toe ext 15x    Ext toes 2-5 15x    Step Ups 4" 2x10 (not today)                                    Akira received the following manual therapy techniques: Myofacial release, Soft tissue Mobilization, and TPR were applied to the: LEFT ANKLE for 10 minutes, including: STM/MRF to medial and lateral incisions.      [place] participated in neuromuscular re-education activities to improve: Balance and Proprioception for xx minutes. The following activities were included:     Left Single-leg stance 2x30" (not today)   Tandem Standing 1x30"with each foot in front (not today)                                   [place] received the following direct contact modalities after being cleared for contraindications: Ultrasound:  [place] received ultrasound to manage pain and inflammation at 100 % duty cycle applied to the right plantar arch at an intensity of  1.5 W/cm2  for a duration of 0 minutes. Patient tolerated treatment well without adverse effects. Therapist was in attendance throughout intervention. (Not today)     [place] received the following supervised modalities after being cleared for contradictions: IFC Electrical Stimulation:  [place] received IFC Electrical Stimulation for pain control applied to the LEFT ANKLE. Pt received stimulation at 100 % scan at 24V for 10 minutes/12 total. [place] tolderated treatment well without any adverse effects.  (Not today)     Akira received hot pack for 0 minutes to xx. (Not today)     [place] received cold pack for 8 minutes to Left ankle.      Home Exercises Provided and Patient Education Provided     Written Home Exercises Provided: Patient instructed to cont prior HEP. Wrist stretching and strengthening  Exercises were reviewed and Akira was able to demonstrate them prior to the end of the session.  Akira demonstrated good  understanding of the education provided.     See EMR under Media for exercises provided 6/5/2024.    Assessment     PTA provided patient with verbal " cues to promote proper form of treatment tasks After treatment patient stated that his pain was a 1/10 in his R wrist, a 2/10 in his L ankle, and a 0/10 in his L knee.     Akira Is progressing well towards his goals.   Pt prognosis is Good.     Pt will continue to benefit from skilled outpatient physical therapy to address the deficits listed in the problem list box on initial evaluation, provide pt/family education and to maximize pt's level of independence in the home and community environment.     Pt's spiritual, cultural and educational needs considered and pt agreeable to plan of care and goals.     Anticipated barriers to physical therapy: none    Goals:         Short Term Goals: 3 weeks   Pt indep with HEP.-MET  Pt will be assessed for need of heel lift to equalize leg length discrepancy.  Pt will have LLE ROM: knee ext -5 deg, DF 20 deg, ankle inv 22 deg, ankle ev 12 deg.-MET EXCEPT DF  Pt will have Right wrist ROM: flexion 35 deg, ext 55 deg.-MET  Pt will have LLE strength: knee ext 4/5, DF/PF 4/5, ankle inv/ev 4-/5.-MET  Pt will have Right wrist strength 4/5 throughout.-MET     Long Term Goals: 6 weeks   Pt will be able to negotiate stairs using a reciprocal gait pattern.-MET FOR [facility] ONLY  Pt will have LLE ROM:  ankle inv 25 deg, ankle ev 15 deg.  Pt will have Right wrist ROM: flexion 60 deg, ext 60 deg.-MET  Pt will have LLE strength: knee ext 4+/5, knee flex 4+/5, DF/PF 4+/5, ankle inv/ev 4/5.  Pt will have Right wrist strength 4+/5 throughout.-MET EXCEPT RADIAL DEV  Pt will have Tinetti Balance/Gait score of at least 24/28 points.    Plan     Plan of care Certification: 5/23/2024 to  7/5/2024.     Outpatient Physical Therapy 2 times weekly for 6 weeks to include the following interventions: Electrical Stimulation , Manual Therapy, Moist Heat/ Ice, Neuromuscular Re-ed, Patient Education, Therapeutic Exercise, and Ultrasound.     Lady Andersen, PTA  6/25/2024

## 2024-06-27 ENCOUNTER — DOCUMENTATION ONLY (OUTPATIENT)
Dept: REHABILITATION | Facility: HOSPITAL | Age: 49
End: 2024-06-27
Payer: COMMERCIAL

## 2024-07-02 ENCOUNTER — CLINICAL SUPPORT (OUTPATIENT)
Dept: REHABILITATION | Facility: HOSPITAL | Age: 49
End: 2024-07-02
Payer: COMMERCIAL

## 2024-07-02 DIAGNOSIS — S82.002D CLOSED DISPLACED FRACTURE OF LEFT PATELLA WITH ROUTINE HEALING, UNSPECIFIED FRACTURE MORPHOLOGY, SUBSEQUENT ENCOUNTER: Primary | ICD-10-CM

## 2024-07-02 PROCEDURE — 97110 THERAPEUTIC EXERCISES: CPT | Mod: CQ
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
## 2024-07-05 ENCOUNTER — DOCUMENTATION ONLY (OUTPATIENT)
Dept: REHABILITATION | Facility: HOSPITAL | Age: 49
End: 2024-07-05
Payer: COMMERCIAL

## 2024-07-05 NOTE — PROGRESS NOTES
Pt left message on answering machine to cancel this morning's appt due to scheduling change at work.

## 2024-07-14 ENCOUNTER — HOSPITAL ENCOUNTER (EMERGENCY)
Facility: HOSPITAL | Age: 49
Discharge: HOME | End: 2024-07-14
Payer: COMMERCIAL

## 2024-07-14 VITALS
SYSTOLIC BLOOD PRESSURE: 129 MMHG | WEIGHT: 185 LBS | BODY MASS INDEX: 30.82 KG/M2 | TEMPERATURE: 99 F | OXYGEN SATURATION: 100 % | HEART RATE: 68 BPM | HEIGHT: 65 IN | RESPIRATION RATE: 18 BRPM | DIASTOLIC BLOOD PRESSURE: 83 MMHG

## 2024-07-14 DIAGNOSIS — S82.042A CLOSED DISPLACED COMMINUTED FRACTURE OF LEFT PATELLA, INITIAL ENCOUNTER: Primary | ICD-10-CM

## 2024-07-14 PROCEDURE — 99284 EMERGENCY DEPT VISIT MOD MDM: CPT | Mod: 25

## 2024-07-14 PROCEDURE — 63600175 PHARM REV CODE 636 W HCPCS: Performed by: NURSE PRACTITIONER

## 2024-07-14 PROCEDURE — 99284 EMERGENCY DEPT VISIT MOD MDM: CPT | Mod: ,,, | Performed by: NURSE PRACTITIONER

## 2024-07-14 PROCEDURE — 96372 THER/PROPH/DIAG INJ SC/IM: CPT | Performed by: NURSE PRACTITIONER

## 2024-07-14 RX ORDER — KETOROLAC TROMETHAMINE 30 MG/ML
30 INJECTION, SOLUTION INTRAMUSCULAR; INTRAVENOUS
Status: COMPLETED | OUTPATIENT
Start: 2024-07-14 | End: 2024-07-14

## 2024-07-14 RX ORDER — NAPROXEN 250 MG/1
250 TABLET ORAL
Qty: 30 TABLET | Refills: 0 | Status: SHIPPED | OUTPATIENT
Start: 2024-07-14

## 2024-07-14 RX ADMIN — KETOROLAC TROMETHAMINE 30 MG: 30 INJECTION, SOLUTION INTRAMUSCULAR at 07:07

## 2024-07-15 NOTE — ED NOTES
Discharge to home via private vehicle, with wife, discharge instructions given with voiced understanding. PUNEET

## 2024-07-15 NOTE — ED TRIAGE NOTES
Patient arrived to Ed via private vehicle with wife with c/o left knee pain and swelling, stated that he fell late last night and his knee started swelling today as well as hurting.
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
## 2024-10-26 PROCEDURE — 99284 EMERGENCY DEPT VISIT MOD MDM: CPT

## 2024-10-27 ENCOUNTER — HOSPITAL ENCOUNTER (EMERGENCY)
Facility: HOSPITAL | Age: 49
Discharge: HOME | End: 2024-10-27
Payer: COMMERCIAL

## 2024-10-27 VITALS
WEIGHT: 185 LBS | DIASTOLIC BLOOD PRESSURE: 88 MMHG | BODY MASS INDEX: 28.04 KG/M2 | HEIGHT: 68 IN | TEMPERATURE: 98 F | RESPIRATION RATE: 14 BRPM | OXYGEN SATURATION: 100 % | SYSTOLIC BLOOD PRESSURE: 113 MMHG | HEART RATE: 60 BPM

## 2024-10-27 DIAGNOSIS — N30.00 ACUTE CYSTITIS WITHOUT HEMATURIA: Primary | ICD-10-CM

## 2024-10-27 LAB
BACTERIA #/AREA URNS HPF: ABNORMAL /HPF
BILIRUB UR QL STRIP: NEGATIVE
CLARITY UR: CLEAR
COLOR UR: YELLOW
GLUCOSE UR STRIP-MCNC: NEGATIVE MG/DL
KETONES UR STRIP-SCNC: NEGATIVE MG/DL
LEUKOCYTE ESTERASE UR QL STRIP: ABNORMAL
NITRITE UR QL STRIP: POSITIVE
PH UR STRIP: 6 PH UNITS
PROT UR QL STRIP: NEGATIVE
RBC # UR STRIP: NEGATIVE /UL
SP GR UR STRIP: 1.02
SQUAMOUS #/AREA URNS LPF: ABNORMAL /LPF
UROBILINOGEN UR STRIP-ACNC: 1 MG/DL
WBC #/AREA URNS HPF: ABNORMAL /HPF

## 2024-10-27 PROCEDURE — 96372 THER/PROPH/DIAG INJ SC/IM: CPT | Performed by: NURSE PRACTITIONER

## 2024-10-27 PROCEDURE — 63600175 PHARM REV CODE 636 W HCPCS: Performed by: NURSE PRACTITIONER

## 2024-10-27 PROCEDURE — 81001 URINALYSIS AUTO W/SCOPE: CPT | Performed by: NURSE PRACTITIONER

## 2024-10-27 PROCEDURE — 81003 URINALYSIS AUTO W/O SCOPE: CPT | Performed by: NURSE PRACTITIONER

## 2024-10-27 RX ORDER — CEFTRIAXONE 1 G/1
1 INJECTION, POWDER, FOR SOLUTION INTRAMUSCULAR; INTRAVENOUS
Status: COMPLETED | OUTPATIENT
Start: 2024-10-27 | End: 2024-10-27

## 2024-10-27 RX ORDER — NITROFURANTOIN 25; 75 MG/1; MG/1
100 CAPSULE ORAL 2 TIMES DAILY
Qty: 10 CAPSULE | Refills: 0 | Status: SHIPPED | OUTPATIENT
Start: 2024-10-27 | End: 2024-11-01

## 2024-10-27 RX ADMIN — CEFTRIAXONE SODIUM 1 G: 1 INJECTION, POWDER, FOR SOLUTION INTRAMUSCULAR; INTRAVENOUS at 01:10

## 2025-01-26 ENCOUNTER — HOSPITAL ENCOUNTER (EMERGENCY)
Facility: HOSPITAL | Age: 50
Discharge: HOME | End: 2025-01-26
Payer: COMMERCIAL

## 2025-01-26 VITALS
HEART RATE: 72 BPM | HEIGHT: 68 IN | OXYGEN SATURATION: 100 % | DIASTOLIC BLOOD PRESSURE: 59 MMHG | RESPIRATION RATE: 16 BRPM | BODY MASS INDEX: 28.95 KG/M2 | TEMPERATURE: 98 F | SYSTOLIC BLOOD PRESSURE: 123 MMHG | WEIGHT: 191 LBS

## 2025-01-26 DIAGNOSIS — N39.0 URINARY TRACT INFECTION WITH HEMATURIA, SITE UNSPECIFIED: Primary | ICD-10-CM

## 2025-01-26 DIAGNOSIS — R31.9 URINARY TRACT INFECTION WITH HEMATURIA, SITE UNSPECIFIED: Primary | ICD-10-CM

## 2025-01-26 LAB
ALBUMIN SERPL BCP-MCNC: 3.6 G/DL (ref 3.5–5)
ALBUMIN/GLOB SERPL: 1.2 {RATIO}
ALP SERPL-CCNC: 77 U/L (ref 40–150)
ALT SERPL W P-5'-P-CCNC: 18 U/L
ANION GAP SERPL CALCULATED.3IONS-SCNC: 14 MMOL/L (ref 7–16)
AST SERPL W P-5'-P-CCNC: 44 U/L (ref 5–34)
BACTERIA #/AREA URNS HPF: ABNORMAL /HPF
BASOPHILS # BLD AUTO: 0.02 K/UL (ref 0–0.2)
BASOPHILS NFR BLD AUTO: 0.3 % (ref 0–1)
BILIRUB SERPL-MCNC: 0.4 MG/DL
BILIRUB UR QL STRIP: NEGATIVE
BUN SERPL-MCNC: 16 MG/DL (ref 9–21)
BUN/CREAT SERPL: 14 (ref 6–20)
CALCIUM SERPL-MCNC: 8.8 MG/DL (ref 8.4–10.2)
CHLORIDE SERPL-SCNC: 110 MMOL/L (ref 98–107)
CLARITY UR: ABNORMAL
CO2 SERPL-SCNC: 22 MMOL/L (ref 22–29)
COLOR UR: YELLOW
CREAT SERPL-MCNC: 1.18 MG/DL (ref 0.72–1.25)
DIFFERENTIAL METHOD BLD: ABNORMAL
EGFR (NO RACE VARIABLE) (RUSH/TITUS): 76 ML/MIN/1.73M2
EOSINOPHIL # BLD AUTO: 0.15 K/UL (ref 0–0.5)
EOSINOPHIL NFR BLD AUTO: 2.6 % (ref 1–4)
ERYTHROCYTE [DISTWIDTH] IN BLOOD BY AUTOMATED COUNT: 13.1 % (ref 11.5–14.5)
GLOBULIN SER-MCNC: 2.9 G/DL (ref 2–4)
GLUCOSE SERPL-MCNC: 101 MG/DL (ref 74–100)
GLUCOSE UR STRIP-MCNC: NEGATIVE MG/DL
HCT VFR BLD AUTO: 38.9 % (ref 40–54)
HGB BLD-MCNC: 12.6 G/DL (ref 13.5–18)
KETONES UR STRIP-SCNC: NEGATIVE MG/DL
LEUKOCYTE ESTERASE UR QL STRIP: ABNORMAL
LYMPHOCYTES # BLD AUTO: 2.5 K/UL (ref 1–4.8)
LYMPHOCYTES NFR BLD AUTO: 43.3 % (ref 27–41)
MCH RBC QN AUTO: 27.8 PG (ref 27–31)
MCHC RBC AUTO-ENTMCNC: 32.4 G/DL (ref 32–36)
MCV RBC AUTO: 85.9 FL (ref 80–96)
MONOCYTES # BLD AUTO: 0.57 K/UL (ref 0–0.8)
MONOCYTES NFR BLD AUTO: 9.9 % (ref 2–6)
MPC BLD CALC-MCNC: 10.5 FL (ref 9.4–12.4)
NEUTROPHILS # BLD AUTO: 2.53 K/UL (ref 1.8–7.7)
NEUTROPHILS NFR BLD AUTO: 43.9 % (ref 53–65)
NITRITE UR QL STRIP: NEGATIVE
PH UR STRIP: 5.5 PH UNITS
PLATELET # BLD AUTO: 194 K/UL (ref 150–400)
POTASSIUM SERPL-SCNC: 4.8 MMOL/L (ref 3.5–5.1)
PROT SERPL-MCNC: 6.5 G/DL (ref 6.4–8.3)
PROT UR QL STRIP: 30
RBC # BLD AUTO: 4.53 M/UL (ref 4.6–6.2)
RBC # UR STRIP: ABNORMAL /UL
RBC #/AREA URNS HPF: ABNORMAL /HPF
SODIUM SERPL-SCNC: 141 MMOL/L (ref 136–145)
SP GR UR STRIP: >=1.03
SQUAMOUS #/AREA URNS LPF: ABNORMAL /LPF
UROBILINOGEN UR STRIP-ACNC: 0.2 MG/DL
WBC # BLD AUTO: 5.77 K/UL (ref 4.5–11)
WBC #/AREA URNS HPF: ABNORMAL /HPF

## 2025-01-26 PROCEDURE — 87077 CULTURE AEROBIC IDENTIFY: CPT | Performed by: NURSE PRACTITIONER

## 2025-01-26 PROCEDURE — 85025 COMPLETE CBC W/AUTO DIFF WBC: CPT | Performed by: NURSE PRACTITIONER

## 2025-01-26 PROCEDURE — 99283 EMERGENCY DEPT VISIT LOW MDM: CPT

## 2025-01-26 PROCEDURE — 81003 URINALYSIS AUTO W/O SCOPE: CPT | Performed by: NURSE PRACTITIONER

## 2025-01-26 PROCEDURE — 99284 EMERGENCY DEPT VISIT MOD MDM: CPT | Mod: ,,, | Performed by: NURSE PRACTITIONER

## 2025-01-26 PROCEDURE — 80053 COMPREHEN METABOLIC PANEL: CPT | Performed by: NURSE PRACTITIONER

## 2025-01-26 RX ORDER — CIPROFLOXACIN 500 MG/1
500 TABLET ORAL 2 TIMES DAILY
Qty: 20 TABLET | Refills: 0 | Status: SHIPPED | OUTPATIENT
Start: 2025-01-26 | End: 2025-02-05

## 2025-01-27 NOTE — ED PROVIDER NOTES
"Encounter Date: 1/26/2025       History     Chief Complaint   Patient presents with    Urinary Frequency     Pt presents with c/o urinary frequency and burning. Symptoms started yesterday.      50 y/o WM presents pov with c/o urinary frequency/dysuria/urgency with onset yesterday. Negative for hematuria/fever/chills. Also c/o intermittent pain left flank. Pain at it's worst 5/10. Current pain level 1/10. Has experienced some mild nausea when pain at it's worst. States it feels like he is passing barbed wire when voiding. States h/o kidney stones.    The history is provided by the patient.     Review of patient's allergies indicates:   Allergen Reactions    Bactrim [sulfamethoxazole-trimethoprim] Anaphylaxis     History reviewed. No pertinent past medical history.  History reviewed. No pertinent surgical history.  Family History   Problem Relation Name Age of Onset    Hypertension Mother      Diabetes Mother      Heart disease Father      Cancer Father       Social History     Tobacco Use    Smoking status: Some Days     Types: Cigarettes    Smokeless tobacco: Current     Types: Snuff    Tobacco comments:     States has been "dipping" 20 years   Substance Use Topics    Alcohol use: Not Currently    Drug use: Never     Review of Systems   Constitutional:  Negative for chills and fever.   Respiratory: Negative.  Negative for apnea, cough, choking, chest tightness, shortness of breath, wheezing and stridor.    Cardiovascular: Negative.  Negative for chest pain, palpitations and leg swelling.   Gastrointestinal:  Positive for nausea. Negative for abdominal distention, abdominal pain, anal bleeding, blood in stool, constipation, diarrhea, rectal pain and vomiting.   Genitourinary:  Positive for difficulty urinating, dysuria, flank pain, frequency and urgency. Negative for decreased urine volume, enuresis, genital sores, hematuria, penile discharge, penile pain, penile swelling, scrotal swelling and testicular pain. "   Skin: Negative.    Neurological: Negative.    Psychiatric/Behavioral: Negative.     All other systems reviewed and are negative.      Physical Exam     Initial Vitals [01/26/25 1953]   BP Pulse Resp Temp SpO2   (!) 123/59 72 16 98 °F (36.7 °C) 100 %      MAP       --         Physical Exam    Nursing note and vitals reviewed.  Constitutional: He appears well-developed and well-nourished. No distress.   Eyes: Conjunctivae and EOM are normal. No scleral icterus.   Cardiovascular:  Normal rate, regular rhythm, normal heart sounds and intact distal pulses.     Exam reveals no gallop and no friction rub.       No murmur heard.  Pulmonary/Chest: Breath sounds normal.   Abdominal: Abdomen is soft. Bowel sounds are normal. There is no abdominal tenderness. No hernia.   No right CVA tenderness.  No left CVA tenderness.   Musculoskeletal:         General: No tenderness. Normal range of motion.     Neurological: He is alert and oriented to person, place, and time. He has normal strength.   Skin: Skin is warm and dry. Capillary refill takes less than 2 seconds.   Psychiatric: He has a normal mood and affect. His behavior is normal. Judgment and thought content normal.         Medical Screening Exam   See Full Note    ED Course   Procedures  Labs Reviewed   URINALYSIS, REFLEX TO URINE CULTURE - Abnormal       Result Value    Color, UA Yellow      Clarity, UA Cloudy      pH, UA 5.5      Leukocytes, UA Moderate (*)     Nitrites, UA Negative      Protein, UA 30 (*)     Glucose, UA Negative      Ketones, UA Negative      Urobilinogen, UA 0.2      Bilirubin, UA Negative      Blood, UA Moderate (*)     Specific Gravity, UA >=1.030 (*)    COMPREHENSIVE METABOLIC PANEL - Abnormal    Sodium 141      Potassium 4.8      Chloride 110 (*)     CO2 22      Anion Gap 14      Glucose 101 (*)     BUN 16      Creatinine 1.18      BUN/Creatinine Ratio 14      Calcium 8.8      Total Protein 6.5      Albumin 3.6      Globulin 2.9      A/G Ratio 1.2       Bilirubin, Total 0.4      Alk Phos 77      ALT 18      AST 44 (*)     eGFR 76     CBC WITH DIFFERENTIAL - Abnormal    WBC 5.77      RBC 4.53 (*)     Hemoglobin 12.6 (*)     Hematocrit 38.9 (*)     MCV 85.9      MCH 27.8      MCHC 32.4      RDW 13.1      Platelet Count 194      MPV 10.5      Neutrophils % 43.9 (*)     Lymphocytes % 43.3 (*)     Neutrophils, Abs 2.53      Lymphocytes, Absolute 2.50      Diff Type Auto      Monocytes % 9.9 (*)     Eosinophils % 2.6      Basophils % 0.3      Monocytes, Absolute 0.57      Eosinophils, Absolute 0.15      Basophils, Absolute 0.02     URINALYSIS, MICROSCOPIC - Abnormal    WBC, UA Too Numerous To Count (*)     RBC, UA 15-25 (*)     Bacteria, UA Few (*)     Squamous Epithelial Cells, UA None Seen To Occasional     CULTURE, URINE   CBC W/ AUTO DIFFERENTIAL    Narrative:     The following orders were created for panel order CBC auto differential.  Procedure                               Abnormality         Status                     ---------                               -----------         ------                     CBC with Differential[[id number]]       Abnormal            Final result                 Please view results for these tests on the individual orders.          Imaging Results    None          Medications - No data to display  Medical Decision Making  50 y/o WM presents pov with c/o urinary frequency/dysuria/urgency with onset yesterday. Negative for hematuria/fever/chills. Also c/o intermittent pain left flank. Pain at it's worst 5/10. Current pain level 1/10. Has experienced some mild nausea when pain at it's worst. States it feels like he is passing barbed wire when voiding. States h/o kidney stones.    Amount and/or Complexity of Data Reviewed  Labs: ordered.     Details: CBC: Unremarkable  CMP: Unremarkable  UA: Blood Moderate, Leukocytes Moderate, Bacteria Few, RBC 15-25, WBC TNTC    Risk  Prescription drug management.               ED Course as of  01/26/25 2042   Sun Jan 26, 2025   2018 Discussed with patient plan for CT to rule out kidney stone. Patient states it doesn't feel like kidney stone he has had in the past. Patient wants to postpone  CT for now ans states he will return to ED if needed. [[place]]      ED Course User Index  [[place]] Kirk Alexis FNP                           Clinical Impression:   Final diagnoses:  [N39.0, R31.9] Urinary tract infection with hematuria, site unspecified (Primary)        ED Disposition Condition    Discharge Stable          ED Prescriptions       Medication Sig Dispense Start Date End Date Auth. Provider    ciprofloxacin HCl (CIPRO) 500 MG tablet Take 1 tablet (500 mg total) by mouth 2 (two) times daily. for 10 days 20 tablet 1/26/2025 2/5/2025 Kirk Alexis FNP          Follow-up Information    None          Kirk Alexis FNP  01/26/25 2042

## 2025-01-29 LAB — UA COMPLETE W REFLEX CULTURE PNL UR: ABNORMAL

## 2025-08-03 ENCOUNTER — HOSPITAL ENCOUNTER (EMERGENCY)
Facility: HOSPITAL | Age: 50
Discharge: HOME | End: 2025-08-03
Payer: COMMERCIAL

## 2025-08-03 VITALS
BODY MASS INDEX: 33.32 KG/M2 | TEMPERATURE: 98 F | HEIGHT: 65 IN | DIASTOLIC BLOOD PRESSURE: 87 MMHG | RESPIRATION RATE: 16 BRPM | SYSTOLIC BLOOD PRESSURE: 140 MMHG | OXYGEN SATURATION: 99 % | HEART RATE: 67 BPM | WEIGHT: 200 LBS

## 2025-08-03 DIAGNOSIS — S20.212A CONTUSION OF RIB ON LEFT SIDE, INITIAL ENCOUNTER: Primary | ICD-10-CM

## 2025-08-03 DIAGNOSIS — R07.81 RIB PAIN: ICD-10-CM

## 2025-08-03 PROCEDURE — 99282 EMERGENCY DEPT VISIT SF MDM: CPT | Mod: ,,, | Performed by: NURSE PRACTITIONER

## 2025-08-03 PROCEDURE — 99283 EMERGENCY DEPT VISIT LOW MDM: CPT | Mod: 25

## 2025-08-03 NOTE — ED PROVIDER NOTES
Encounter Date: 8/3/2025       History     Chief Complaint   Patient presents with    Fall     From standing, approx 1200 today, c/o left rib pain     Patient presents today with complaint of left lateral ribcage pain after a fall landing on this side around 2:00 p.m. today.  States pain is worse with the breathing and certain movements.  Improved somewhat with rest.  Pain is described as sharp.  He has not had any difficulty breathing.  Denies any other contributing or modifying factors        Review of patient's allergies indicates:   Allergen Reactions    Bactrim [sulfamethoxazole-trimethoprim] Anaphylaxis     History reviewed. No pertinent past medical history.  History reviewed. No pertinent surgical history.  Family History   Problem Relation Name Age of Onset    Hypertension Mother      Diabetes Mother      Heart disease Father      Cancer Father       Social History[1]  Review of Systems   Constitutional: Negative.    Respiratory: Negative.     Cardiovascular: Negative.    All other systems reviewed and are negative.      Physical Exam     Initial Vitals [08/03/25 1632]   BP Pulse Resp Temp SpO2   (!) 140/87 67 16 98.2 °F (36.8 °C) 99 %      MAP       --         Physical Exam    Nursing note and vitals reviewed.  Eyes: EOM are normal.   Neck: Neck supple.   Cardiovascular:  Normal rate, regular rhythm and normal heart sounds.           No murmur heard.  Pulmonary/Chest: Breath sounds normal. No respiratory distress. Tenderness: left lateral chest wall, no crepitus.   Musculoskeletal:         General: No tenderness or edema. Normal range of motion.      Cervical back: Neck supple.     Neurological: He is alert and oriented to person, place, and time. He has normal strength.   Skin: Skin is warm and dry.   Psychiatric: He has a normal mood and affect.         Medical Screening Exam   See Full Note    ED Course   Procedures  Labs Reviewed - No data to display       Imaging Results              X-Ray Chest AP  "Portable (In process)                      Medications - No data to display  Medical Decision Making  Amount and/or Complexity of Data Reviewed  Radiology: ordered.               ED Course as of 08/03/25 1707   Sun Aug 03, 2025   1705 Portable chest shows lungs are up and no evidence of acute rib fractures. [BC]      ED Course User Index  [BC] Shan Truong NP                           Clinical Impression:   Final diagnoses:  [R07.81] Rib pain  [S20.212A] Contusion of rib on left side, initial encounter (Primary)        ED Disposition Condition    Discharge Stable          ED Prescriptions    None       Follow-up Information    None            [1]   Social History  Tobacco Use    Smoking status: Some Days     Types: Cigarettes    Smokeless tobacco: Current     Types: Snuff    Tobacco comments:     States has been "dipping" 20 years   Substance Use Topics    Alcohol use: Not Currently    Drug use: Never        Shan Truong NP  08/03/25 1707    "

## 2025-08-17 ENCOUNTER — HOSPITAL ENCOUNTER (EMERGENCY)
Facility: HOSPITAL | Age: 50
Discharge: HOME | End: 2025-08-17
Payer: COMMERCIAL

## 2025-08-17 VITALS
RESPIRATION RATE: 20 BRPM | DIASTOLIC BLOOD PRESSURE: 81 MMHG | OXYGEN SATURATION: 98 % | HEART RATE: 63 BPM | WEIGHT: 198.13 LBS | TEMPERATURE: 98 F | BODY MASS INDEX: 33.01 KG/M2 | HEIGHT: 65 IN | SYSTOLIC BLOOD PRESSURE: 120 MMHG

## 2025-08-17 DIAGNOSIS — T14.8XXA SKIN AVULSION: Primary | ICD-10-CM

## 2025-08-17 PROCEDURE — 99281 EMR DPT VST MAYX REQ PHY/QHP: CPT

## 2025-08-17 PROCEDURE — 99283 EMERGENCY DEPT VISIT LOW MDM: CPT | Mod: GF,,, | Performed by: NURSE PRACTITIONER
